# Patient Record
Sex: MALE | Race: WHITE | NOT HISPANIC OR LATINO | Employment: FULL TIME | URBAN - METROPOLITAN AREA
[De-identification: names, ages, dates, MRNs, and addresses within clinical notes are randomized per-mention and may not be internally consistent; named-entity substitution may affect disease eponyms.]

---

## 2019-01-15 ENCOUNTER — OFFICE VISIT (OUTPATIENT)
Dept: INTERNAL MEDICINE | Facility: CLINIC | Age: 34
End: 2019-01-15
Payer: COMMERCIAL

## 2019-01-15 VITALS
HEART RATE: 72 BPM | HEIGHT: 70 IN | SYSTOLIC BLOOD PRESSURE: 102 MMHG | BODY MASS INDEX: 25.68 KG/M2 | TEMPERATURE: 98.5 F | RESPIRATION RATE: 18 BRPM | OXYGEN SATURATION: 97 % | WEIGHT: 179.4 LBS | DIASTOLIC BLOOD PRESSURE: 68 MMHG

## 2019-01-15 DIAGNOSIS — E78.2 MIXED HYPERLIPIDEMIA: Primary | ICD-10-CM

## 2019-01-15 LAB
ALBUMIN SERPL-MCNC: 4.3 G/DL (ref 3.4–5)
ALP SERPL-CCNC: 59 U/L (ref 40–150)
ALT SERPL W P-5'-P-CCNC: 46 U/L (ref 0–70)
AST SERPL W P-5'-P-CCNC: 87 U/L (ref 0–45)
BILIRUB DIRECT SERPL-MCNC: 0.2 MG/DL (ref 0–0.2)
BILIRUB SERPL-MCNC: 0.8 MG/DL (ref 0.2–1.3)
CHOLEST SERPL-MCNC: 308 MG/DL
HDLC SERPL-MCNC: 48 MG/DL
LDLC SERPL CALC-MCNC: 242 MG/DL
NONHDLC SERPL-MCNC: 260 MG/DL
PROT SERPL-MCNC: 7.7 G/DL (ref 6.8–8.8)
TRIGL SERPL-MCNC: 90 MG/DL

## 2019-01-15 PROCEDURE — 80061 LIPID PANEL: CPT | Performed by: INTERNAL MEDICINE

## 2019-01-15 PROCEDURE — 99203 OFFICE O/P NEW LOW 30 MIN: CPT | Performed by: INTERNAL MEDICINE

## 2019-01-15 PROCEDURE — 80076 HEPATIC FUNCTION PANEL: CPT | Performed by: INTERNAL MEDICINE

## 2019-01-15 PROCEDURE — 36415 COLL VENOUS BLD VENIPUNCTURE: CPT | Performed by: INTERNAL MEDICINE

## 2019-01-15 RX ORDER — OMEGA-3/DHA/EPA/FISH OIL 60 MG-90MG
CAPSULE ORAL
COMMUNITY

## 2019-01-15 ASSESSMENT — ENCOUNTER SYMPTOMS
NAUSEA: 0
DYSURIA: 0
HEARTBURN: 0
FEVER: 0
ARTHRALGIAS: 0
WEAKNESS: 0
DIARRHEA: 0
PARESTHESIAS: 0
SORE THROAT: 0
HEADACHES: 0
MYALGIAS: 0
CONSTIPATION: 0
NERVOUS/ANXIOUS: 1
SHORTNESS OF BREATH: 1
JOINT SWELLING: 0
HEMATOCHEZIA: 0
FREQUENCY: 0
ABDOMINAL PAIN: 0
DIZZINESS: 0
HEMATURIA: 0
COUGH: 0
PALPITATIONS: 0
EYE PAIN: 0

## 2019-01-15 ASSESSMENT — MIFFLIN-ST. JEOR: SCORE: 1760

## 2019-01-15 NOTE — PROGRESS NOTES
"  SUBJECTIVE:   Melecio Platt is a 33 year old male who presents to clinic today for the following health issues:    Hyperlipidemia Follow-Up      Rate your low fat/cholesterol diet?: yes, had abnormal lipids last yr. Total cholesterol was 328 and  . On diet and exercise     Taking statin?  No    Other lipid medications/supplements?:  none      Patient Active Problem List   Diagnosis     Mixed hyperlipidemia     Past Surgical History:   Procedure Laterality Date     ORTHOPEDIC SURGERY  2006    Bilateral ankle       Social History     Tobacco Use     Smoking status: Never Smoker     Smokeless tobacco: Never Used   Substance Use Topics     Alcohol use: Yes     Comment: 1x/week     Family History   Problem Relation Age of Onset     Hyperlipidemia Mother      Hyperlipidemia Maternal Grandmother          Current Outpatient Medications   Medication Sig Dispense Refill     glucosamine-chondroitin 500-400 MG CAPS Take 1 capsule by mouth daily       Omega-3 Fatty Acids (FISH OIL) 500 MG CAPS        VITAMIN D, CHOLECALCIFEROL, PO Take by mouth daily         Reviewed and updated as needed this visit by clinical staff  Tobacco  Allergies  Meds  Med Hx  Surg Hx  Fam Hx  Soc Hx      Reviewed and updated as needed this visit by Provider         ROS:  CONSTITUTIONAL: NEGATIVE for fever, chills, change in weight  RESP: NEGATIVE for significant cough or SOB  CV: NEGATIVE for chest pain, palpitations or peripheral edema  MUSCULOSKELETAL: NEGATIVE for significant arthralgias or myalgia  NEURO: NEGATIVE for weakness, dizziness or paresthesias    OBJECTIVE:                                                    /68   Pulse 72   Temp 98.5  F (36.9  C) (Oral)   Resp 18   Ht 1.778 m (5' 10\")   Wt 81.4 kg (179 lb 6.4 oz)   SpO2 97%   BMI 25.74 kg/m    Body mass index is 25.74 kg/m .   GENERAL: healthy, alert, well nourished, well hydrated, no distress  EYES: Eyes grossly normal to inspection, extraocular movements " - intact, and PERRL  NECK: no tenderness, no adenopathy, no asymmetry, no masses, no stiffness;   RESP: lungs clear to auscultation - no rales, no rhonchi, no wheezes  CV: regular rates and rhythm,    MS: extremities-   no edema, no calf tenderness        ASSESSMENT/PLAN:                                                         (E78.2) Mixed hyperlipidemia  (primary encounter diagnosis)  Plan: Lipid panel reflex to direct LDL Fasting, Hepatic panel- total cholesterol 308 and  -- Very high cholesterol and LDL , pt was recommend to start  low dose cholesterol lowering med Lipitor 10 mg daily and repeat lipids in 3 months , if lipids still high then we can increase Lipitor to 20 mg Also, slightly high AST , advised to cut down any alcohol intake will repeat iver test in 3 months.    The benefits of lowering the cholesterol and improving the balance of lipids in view of the patient's age and risk status have been discussed, as well as verbal review of appropriate diet.  The need for lifelong general compliance in order to reduce risk is stressed.  A gradual exercise program is recommended to help maintain normal body weight and improve lipid balance.  Started on atorvastatin (LIPITOR) 10 MG tablet.explained clearly about the medication,insructions and side effects.            Abbi Lugo MD  [facility]

## 2019-01-15 NOTE — NURSING NOTE
"/68   Pulse 72   Temp 98.5  F (36.9  C) (Oral)   Resp 18   Ht 1.778 m (5' 10\")   Wt 81.4 kg (179 lb 6.4 oz)   SpO2 97%   BMI 25.74 kg/m    Patient in to discuss lipids.  Selin Mcmullen, TURNER    "

## 2019-01-18 PROBLEM — E78.2 MIXED HYPERLIPIDEMIA: Status: ACTIVE | Noted: 2019-01-18

## 2019-01-18 RX ORDER — ATORVASTATIN CALCIUM 10 MG/1
10 TABLET, FILM COATED ORAL DAILY
Qty: 90 TABLET | Refills: 3 | Status: SHIPPED | OUTPATIENT
Start: 2019-01-18 | End: 2021-02-19

## 2020-03-02 ENCOUNTER — HEALTH MAINTENANCE LETTER (OUTPATIENT)
Age: 35
End: 2020-03-02

## 2020-12-20 ENCOUNTER — HEALTH MAINTENANCE LETTER (OUTPATIENT)
Age: 35
End: 2020-12-20

## 2021-02-19 ENCOUNTER — OFFICE VISIT (OUTPATIENT)
Dept: INTERNAL MEDICINE | Facility: CLINIC | Age: 36
End: 2021-02-19
Payer: COMMERCIAL

## 2021-02-19 VITALS
RESPIRATION RATE: 17 BRPM | SYSTOLIC BLOOD PRESSURE: 109 MMHG | WEIGHT: 177.3 LBS | HEART RATE: 75 BPM | OXYGEN SATURATION: 97 % | DIASTOLIC BLOOD PRESSURE: 70 MMHG | TEMPERATURE: 98.9 F | HEIGHT: 70 IN | BODY MASS INDEX: 25.38 KG/M2

## 2021-02-19 DIAGNOSIS — M77.02 MEDIAL EPICONDYLITIS OF ELBOW, LEFT: ICD-10-CM

## 2021-02-19 DIAGNOSIS — M25.561 RIGHT KNEE PAIN, UNSPECIFIED CHRONICITY: ICD-10-CM

## 2021-02-19 DIAGNOSIS — Z00.00 ROUTINE HISTORY AND PHYSICAL EXAMINATION OF ADULT: Primary | ICD-10-CM

## 2021-02-19 DIAGNOSIS — E78.2 MIXED HYPERLIPIDEMIA: ICD-10-CM

## 2021-02-19 PROCEDURE — 99395 PREV VISIT EST AGE 18-39: CPT | Performed by: INTERNAL MEDICINE

## 2021-02-19 PROCEDURE — 99213 OFFICE O/P EST LOW 20 MIN: CPT | Mod: 25 | Performed by: INTERNAL MEDICINE

## 2021-02-19 RX ORDER — BETA-GLUCAN, (1-3) (1-4) 70 %
200 POWDER (GRAM) MISCELLANEOUS
COMMUNITY

## 2021-02-19 ASSESSMENT — MIFFLIN-ST. JEOR: SCORE: 1740.48

## 2021-02-19 ASSESSMENT — ENCOUNTER SYMPTOMS
FREQUENCY: 0
EYE PAIN: 0
HEMATOCHEZIA: 0
HEARTBURN: 0
FEVER: 0
CHILLS: 0
MYALGIAS: 1
ABDOMINAL PAIN: 0
PALPITATIONS: 0
PARESTHESIAS: 0
DYSURIA: 0
CONSTIPATION: 0
ARTHRALGIAS: 1
COUGH: 0
SHORTNESS OF BREATH: 0
WEAKNESS: 0
JOINT SWELLING: 1
NAUSEA: 0
HEADACHES: 0
HEMATURIA: 0
DIZZINESS: 0
DIARRHEA: 0
NERVOUS/ANXIOUS: 0
SORE THROAT: 0

## 2021-02-19 NOTE — NURSING NOTE
"/70   Pulse 75   Temp 98.9  F (37.2  C) (Oral)   Resp 17   Ht 1.778 m (5' 10\")   Wt 80.4 kg (177 lb 4.8 oz)   SpO2 97%   BMI 25.44 kg/m    Patient in for Male Px.  Selin Mcmullen CMA    "

## 2021-02-19 NOTE — PROGRESS NOTES
SUBJECTIVE:   CC: Melecio Platt is an 35 year old male who presents for preventative health visit.       Patient has been advised of split billing requirements and indicates understanding: Yes  Healthy Habits:     Getting at least 3 servings of Calcium per day:  Yes    Bi-annual eye exam:  Yes    Dental care twice a year:  Yes    Sleep apnea or symptoms of sleep apnea:  None    Diet:  Regular (no restrictions) and Gluten-free/reduced    Frequency of exercise:  2-3 days/week    Duration of exercise:  15-30 minutes    Taking medications regularly:  No    Barriers to taking medications:  Side effects    Medication side effects:  Not applicable    PHQ-2 Total Score: 0    Additional concerns today:  Yes       Patient complains of right knee pain since 4 to 5 days along with the swelling , patient states he runs every other day, 30-50 mins on a treadmill.  Patient states he was diagnosed with juvenile rheumatoid arthritis when he was a child.  Patient is active and would like to run regularly for exercise.  Has been taking over-the-counter ibuprofen without much symptom relief.    Patient also complains of Lt elbow and fore arm pain since 2 months, patient states he was carrying wood about 2 months ago and noticed the pain after that and he also plays a lot of guitar,  .      Hyperlipidemia Follow-Up      Are you regularly taking any medication or supplement to lower your cholesterol?   No    Are you having muscle aches or other side effects that you think could be caused by your cholesterol lowering medication?  NA      Today's PHQ-2 Score:   PHQ-2 ( 1999 [employer]) 2/19/2021   Q1: Little interest or pleasure in doing things 0   Q2: Feeling down, depressed or hopeless 0   PHQ-2 Score 0   Q1: Little interest or pleasure in doing things Not at all   Q2: Feeling down, depressed or hopeless Not at all   PHQ-2 Score 0       Abuse: Current or Past(Physical, Sexual or Emotional)- No  Do you feel safe in your environment?  Yes    Have you ever done Advance Care Planning? (For example, a Health Directive, POLST, or a discussion with a medical provider or your loved ones about your wishes): Yes, advance care planning is on file.      History reviewed. No pertinent past medical history.    Past Surgical History:   Procedure Laterality Date     ORTHOPEDIC SURGERY  2006    Bilateral ankle       Current Outpatient Medications   Medication Sig Dispense Refill     Beta Glucan POWD 200 mg 1 tablet/daily       glucosamine-chondroitin 500-400 MG CAPS Take 1 capsule by mouth daily       Misc Natural Products (JOINT HEALTH PO)        Omega-3 Fatty Acids (FISH OIL) 500 MG CAPS          Family History   Problem Relation Age of Onset     Hyperlipidemia Mother      Hyperlipidemia Maternal Grandmother        Social History     Tobacco Use     Smoking status: Never Smoker     Smokeless tobacco: Never Used   Substance Use Topics     Alcohol use: Yes     Comment: 1x/week     If you drink alcohol do you typically have >3 drinks per day or >7 drinks per week? No    Alcohol Use 2/19/2021   Prescreen: >3 drinks/day or >7 drinks/week? No   No flowsheet data found.    Last PSA: No results found for: PSA    Reviewed orders with patient. Reviewed health maintenance and updated orders accordingly - Yes      Reviewed and updated as needed this visit by clinical staff                 Reviewed and updated as needed this visit by Provider                    Review of Systems   Constitutional: Negative for chills and fever.   HENT: Negative for congestion, ear pain, hearing loss and sore throat.    Eyes: Negative for pain and visual disturbance.   Respiratory: Negative for cough and shortness of breath.    Cardiovascular: Negative for chest pain, palpitations and peripheral edema.   Gastrointestinal: Negative for abdominal pain, constipation, diarrhea, heartburn, hematochezia and nausea.   Genitourinary: Negative for discharge, dysuria, frequency, genital sores,  "hematuria, impotence and urgency.   Musculoskeletal: Positive for arthralgias, joint swelling and myalgias.   Skin: Negative for rash.   Neurological: Negative for dizziness, weakness, headaches and paresthesias.   Psychiatric/Behavioral: Negative for mood changes. The patient is not nervous/anxious.         OBJECTIVE:   /70   Pulse 75   Temp 98.9  F (37.2  C) (Oral)   Resp 17   Ht 1.778 m (5' 10\")   Wt 80.4 kg (177 lb 4.8 oz)   SpO2 97%   BMI 25.44 kg/m      Physical Exam  GENERAL: healthy, alert and no distress  EYES: Eyes grossly normal to inspection, PERRL and conjunctivae and sclerae normal  NECK: no adenopathy, no asymmetry, masses, or scars and thyroid normal to palpation  RESP: lungs clear to auscultation - no rales, rhonchi or wheezes  CV: regular rate and rhythm, normal S1 S2, no S3 or S4, no murmur, click or rub, no peripheral edema and peripheral pulses strong  ABDOMEN: soft, nontender, no hepatosplenomegaly, no masses and bowel sounds normal  MS: Rt knee- mild swelling noted and tenderness on jason lateral knee, lt elbow-tenderness on the medial epicondyles and medial forearm.  NEURO: Normal strength and tone, mentation intact and speech normal  PSYCH: mentation appears normal, affect normal/bright      ASSESSMENT/PLAN:       (Z00.00) Routine history and physical examination of adult  (primary encounter diagnosis)  Plan: Hemoglobin, Comprehensive metabolic panel,         Lipid panel reflex to direct LDL Fasting            (E78.2) Mixed hyperlipidemia  Plan: On diet and exercise .  Patient had very high cholesterol and LDL at last office visit and he has been following diet and exercise, check lipid panel reflex to direct LDL Fasting.pt was told I will contact after results and proceed accordingly.  Patient had a questions on statin and other cholesterol medications discussed in detail with the patient about statins there are side effects.     Elevated AST; 2-3 beers per week patient was " "advised to follow low-fat diet and will repeat liver enzymes             (M25.561) Right knee pain, unspecified chronicity  Plan: Orthopedic & Spine  Referral            (M77.02) Medial epicondylitis of elbow, left  Plan: Explained about the condition, APPLY ELBOW STRAP,ICE PRN,IBUPROFEN 400MG Q 8HRLY,LIMIT LIFTING,REPITITIOUS WRIST AND HAND WORK.Call or return to clinic prn if these symtoms worsen, fail to improve as anticipated, or if new symptoms develop.      Patient has been advised of split billing requirements and indicates understanding: Yes  COUNSELING:   Reviewed preventive health counseling, as reflected in patient instructions       Regular exercise       Healthy diet/nutrition    Estimated body mass index is 25.44 kg/m  as calculated from the following:    Height as of this encounter: 1.778 m (5' 10\").    Weight as of this encounter: 80.4 kg (177 lb 4.8 oz).     Weight management plan: Discussed healthy diet and exercise guidelines    He reports that he has never smoked. He has never used smokeless tobacco.         Counseling Resources:  ATP IV Guidelines  Pooled Cohorts Equation Calculator  FRAX Risk Assessment  ICSI Preventive Guidelines  Dietary Guidelines for Americans, 2010  USDA's MyPlate  ASA Prophylaxis  Lung CA Screening    Abbi Lugo MD  [facility]  "

## 2021-03-05 ENCOUNTER — OFFICE VISIT (OUTPATIENT)
Dept: ORTHOPEDICS | Facility: CLINIC | Age: 36
End: 2021-03-05
Attending: INTERNAL MEDICINE
Payer: COMMERCIAL

## 2021-03-05 ENCOUNTER — ANCILLARY PROCEDURE (OUTPATIENT)
Dept: GENERAL RADIOLOGY | Facility: CLINIC | Age: 36
End: 2021-03-05
Attending: FAMILY MEDICINE
Payer: COMMERCIAL

## 2021-03-05 VITALS
WEIGHT: 177 LBS | BODY MASS INDEX: 25.34 KG/M2 | DIASTOLIC BLOOD PRESSURE: 70 MMHG | HEIGHT: 70 IN | SYSTOLIC BLOOD PRESSURE: 116 MMHG

## 2021-03-05 DIAGNOSIS — M25.561 RIGHT KNEE PAIN, UNSPECIFIED CHRONICITY: ICD-10-CM

## 2021-03-05 DIAGNOSIS — M25.461 SWELLING OF JOINT OF RIGHT KNEE: Primary | ICD-10-CM

## 2021-03-05 PROCEDURE — 73562 X-RAY EXAM OF KNEE 3: CPT | Mod: RT | Performed by: FAMILY MEDICINE

## 2021-03-05 PROCEDURE — 99244 OFF/OP CNSLTJ NEW/EST MOD 40: CPT | Performed by: FAMILY MEDICINE

## 2021-03-05 RX ORDER — CELECOXIB 200 MG/1
200 CAPSULE ORAL DAILY PRN
Qty: 30 CAPSULE | Refills: 2 | Status: SHIPPED | OUTPATIENT
Start: 2021-03-05 | End: 2022-05-27

## 2021-03-05 ASSESSMENT — MIFFLIN-ST. JEOR: SCORE: 1739.12

## 2021-03-05 NOTE — PROGRESS NOTES
ASSESSMENT & PLAN  Patient Instructions     1. Swelling of joint of right knee    2. Right knee pain, unspecified chronicity      -Patient has right knee pain and swelling likely due to an inflammatory condition  -Patient has history of possible juvenile rheumatoid arthritis that required anti-inflammatory usage for over a year  -Patient will obtain inflammatory blood work at his primary care physician's office.  He may call to schedule a nursing visit to have labs drawn.  -Patient will start Celebrex 200 mg daily for the next 2 to 3 weeks and then on an as-needed basis as pain improves  -Patient will start formal physical therapy and home exercise program  -Patient will follow up in 3 to 4 weeks for reevaluation and progression of activity.  If swelling worsens, to consider drainage and sending off for synovial fluid analysis  -Call direct clinic number [[phone]] at any time with questions or concerns.    Albert Yeo MD [facility]  [facility]          -----    SUBJECTIVE  Melecio Platt is a/an 35 year old male who is seen in consultation at the request of  Abbi Lugo M.D. for evaluation of right knee pain. The patient is seen by themselves.    Onset: 3 week(s) ago. Reports insidious onset without acute precipitating event. First noticed when the weather was really cold. Does have a history of knee pain and states was diagnosed with RA at the age of 14; but pain went away.   Location of Pain: right joint line and medial aspect of the knee, with some occasional posterior pain as well.   Rating of Pain at worst: 2/10  Rating of Pain Currently: 1/10  Worsened by: pressure on the knees, kneeling, treadmill on the an incline. 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     No past medical history on file.  Social History     Socioeconomic History     Marital status:      Spouse name: Not on file     Number of children: Not on file     Years of education: Not on file     Highest education level: Not on file   Occupational History     Not on file   Social Needs     Financial resource strain: Not on file     Food insecurity     Worry: Not on file     Inability: Not on file     Transportation needs     Medical: Not on file     Non-medical: Not on file   Tobacco Use     Smoking status: Never Smoker     Smokeless tobacco: Never Used   Substance and Sexual Activity     Alcohol use: Yes     Comment: 1x/week     Drug use: No     Sexual activity: Yes     Partners: Female   Lifestyle     Physical activity     Days per week: Not on file     Minutes per session: Not on file     Stress: Not on file   Relationships     Social connections     Talks on phone: Not on file     Gets together: Not on file     Attends Rastafarian service: Not on file     Active member of club or organization: Not on file     Attends meetings of clubs or organizations: Not on file     Relationship status: Not on file     Intimate partner violence     Fear of current or ex partner: Not on file     Emotionally abused: Not on file     Physically abused: Not on file     Forced sexual activity: Not on file   Other Topics Concern     Not on file   Social History Narrative     Not on file         Patient's past medical, surgical, social, and family histories were reviewed today and no changes are noted.    REVIEW OF SYSTEMS:  10 point ROS is negative other than symptoms noted above in HPI, Past Medical History or as stated below  Constitutional: NEGATIVE for fever, chills, change in weight  Skin: NEGATIVE for  "worrisome rashes, moles or lesions  GI/: NEGATIVE for bowel or bladder changes  Neuro: NEGATIVE for weakness, dizziness or paresthesias    OBJECTIVE:  /70   Ht 1.778 m (5' 10\")   Wt 80.3 kg (177 lb)   BMI 25.40 kg/m     General: healthy, alert and in no distress  HEENT: no scleral icterus or conjunctival erythema  Skin: no suspicious lesions or rash. No jaundice.  CV: no pedal edema  Resp: normal respiratory effort without conversational dyspnea   Psych: normal mood and affect  Gait: normal steady gait with appropriate coordination and balance  Neuro: Normal light sensory exam of lower extremity  MSK:  LEFT KNEE  Inspection:    normal alignment  Palpation:    Tender about the lateral patellar facet, medial patellar facet, lateral joint line and medial joint line. Remainder of bony and ligamentous landmarks are nontender.    Mild effusion is present    Patellofemoral crepitus is Absent  Range of Motion:     00 extension to 1250 flexion  Strength:    Quadriceps grossly intact    Extensor mechanism intact  Special Tests:    Positive: Gerry's    Negative: MCL/valgus stress (0 & 30 deg), LCL/varus stress (0 & 30 deg), Lachman's, anterior drawer, posterior drawer    Independent visualization of the below image:  Recent Results (from the past 24 hour(s))   XR Knee Standing AP Bilat [place] Bilat Lat Right    Narrative    No acute fracture, dislocation or osseous abnormalities.  Lateral tilting   of patella.             Albert Yeo MD [facility]    "

## 2021-03-05 NOTE — PATIENT INSTRUCTIONS
1. Swelling of joint of right knee    2. Right knee pain, unspecified chronicity      -Patient has right knee pain and swelling likely due to an inflammatory condition  -Patient has history of possible juvenile rheumatoid arthritis that required anti-inflammatory usage for over a year  -Patient will obtain inflammatory blood work at his primary care physician's office.  He may call to schedule a nursing visit to have labs drawn.  -Patient will start Celebrex 200 mg daily for the next 2 to 3 weeks and then on an as-needed basis as pain improves  -Patient will start formal physical therapy and home exercise program  -Patient will follow up in 3 to 4 weeks for reevaluation and progression of activity.  If swelling worsens, to consider drainage and sending off for synovial fluid analysis  -Call direct clinic number [[phone]] at any time with questions or concerns.    Albert Yeo MD [facility]  [facility] [place]

## 2021-03-05 NOTE — LETTER
3/5/2021         RE: Melecio Platt  [address] [place] [facility] [address]        Dear Colleague,    Thank you for referring your patient, Melecio Platt, to the [facility] [place]. Please see a copy of my visit note below.    ASSESSMENT & PLAN  Patient Instructions     1. Swelling of joint of right knee    2. Right knee pain, unspecified chronicity      -Patient has right knee pain and swelling likely due to an inflammatory condition  -Patient has history of possible juvenile rheumatoid arthritis that required anti-inflammatory usage for over a year  -Patient will obtain inflammatory blood work at his primary care physician's office.  He may call to schedule a nursing visit to have labs drawn.  -Patient will start Celebrex 200 mg daily for the next 2 to 3 weeks and then on an as-needed basis as pain improves  -Patient will start formal physical therapy and home exercise program  -Patient will follow up in 3 to 4 weeks for reevaluation and progression of activity.  If swelling worsens, to consider drainage and sending off for synovial fluid analysis  -Call direct clinic number [[phone]] at any time with questions or concerns.    Albert Yeo MD [facility]  [facility] [place]          -----    SUBJECTIVE  Melecio Platt is a/an 35 year old male who is seen in consultation at the request of  Abbi Lugo M.D. for evaluation of right knee pain. The patient is seen by themselves.    Onset: 3 week(s) ago. Reports insidious onset without acute precipitating event. First noticed when the weather was really cold. Does have a history of knee pain and states was diagnosed with RA at the age of 14; but pain went away.   Location of Pain: right joint line and medial aspect of the knee, with some occasional posterior pain as well.   Rating of Pain at worst: 2/10  Rating of Pain Currently: 1/10  Worsened by: pressure  on the knees, kneeling, treadmill on the an incline. 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     No past medical history on file.  Social History     Socioeconomic History     Marital status:      Spouse name: Not on file     Number of children: Not on file     Years of education: Not on file     Highest education level: Not on file   Occupational History     Not on file   Social Needs     Financial resource strain: Not on file     Food insecurity     Worry: Not on file     Inability: Not on file     Transportation needs     Medical: Not on file     Non-medical: Not on file   Tobacco Use     Smoking status: Never Smoker     Smokeless tobacco: Never Used   Substance and Sexual Activity     Alcohol use: Yes     Comment: 1x/week     Drug use: No     Sexual activity: Yes     Partners: Female   Lifestyle     Physical activity     Days per week: Not on file     Minutes per session: Not on file     Stress: Not on file   Relationships     Social connections     Talks on phone: Not on file     Gets together: Not on file     Attends Lutheran service: Not on file     Active member of club or organization: Not on file     Attends meetings of clubs or organizations: Not on file     Relationship status: Not on file     Intimate partner violence     Fear of current or ex partner: Not on file     Emotionally abused: Not on file     Physically abused: Not on file     Forced sexual activity: Not on file   Other Topics Concern     Not on file   Social History Narrative     Not on file         Patient's past medical, surgical, social, and family histories  "were reviewed today and no changes are noted.    REVIEW OF SYSTEMS:  10 point ROS is negative other than symptoms noted above in HPI, Past Medical History or as stated below  Constitutional: NEGATIVE for fever, chills, change in weight  Skin: NEGATIVE for worrisome rashes, moles or lesions  GI/: NEGATIVE for bowel or bladder changes  Neuro: NEGATIVE for weakness, dizziness or paresthesias    OBJECTIVE:  /70   Ht 1.778 m (5' 10\")   Wt 80.3 kg (177 lb)   BMI 25.40 kg/m     General: healthy, alert and in no distress  HEENT: no scleral icterus or conjunctival erythema  Skin: no suspicious lesions or rash. No jaundice.  CV: no pedal edema  Resp: normal respiratory effort without conversational dyspnea   Psych: normal mood and affect  Gait: normal steady gait with appropriate coordination and balance  Neuro: Normal light sensory exam of lower extremity  MSK:  LEFT KNEE  Inspection:    normal alignment  Palpation:    Tender about the lateral patellar facet, medial patellar facet, lateral joint line and medial joint line. Remainder of bony and ligamentous landmarks are nontender.    Mild effusion is present    Patellofemoral crepitus is Absent  Range of Motion:     00 extension to 1250 flexion  Strength:    Quadriceps grossly intact    Extensor mechanism intact  Special Tests:    Positive: Gerry's    Negative: MCL/valgus stress (0 & 30 deg), LCL/varus stress (0 & 30 deg), Lachman's, anterior drawer, posterior drawer    Independent visualization of the below image:  Recent Results (from the past 24 hour(s))   XR Knee Standing AP Bilat [place] Bilat Lat Right    Narrative    No acute fracture, dislocation or osseous abnormalities.  Lateral tilting   of patella.             Albert Yeo MD [facility]        Again, thank you for allowing me to participate in the care of your patient.        Sincerely,        Albert Yeo, MD    "

## 2021-03-08 ENCOUNTER — TELEPHONE (OUTPATIENT)
Dept: ORTHOPEDICS | Facility: CLINIC | Age: 36
End: 2021-03-08

## 2021-03-08 NOTE — TELEPHONE ENCOUNTER
Prior Authorization Specialty Medication Request    Medication/Dose: Celecoxib 200mg  ICD code (if different than what is on RX):   Previously Tried and Failed:  Rest, ibuprofen      Insurance Name: [id number] [facility]   Insurance ID: [id number]   Insurance Phone Number: [phone]     Pharmacy Information (if different than what is on RX)  [facility]  [facility]  Phone:  [phone]

## 2021-03-09 NOTE — TELEPHONE ENCOUNTER
Central Prior Authorization Team   Phone: [phone]      PA Initiation    Medication: Celecoxib 200mg  Insurance Company: [employer] - Phone [phone] Fax [phone]  Pharmacy Filling the Rx: [facility] #[id number] [place], [place] - [address] [place] [address] [address] AT [facility] & [facility]  Filling Pharmacy Phone: [phone]  Filling Pharmacy Fax:    Start Date: 3/9/2021

## 2021-03-09 NOTE — TELEPHONE ENCOUNTER
Prior Authorization Approval    Authorization Effective Date: 2/7/2021  Authorization Expiration Date: 3/9/2022  Medication: Celecoxib 200mg  Approved Dose/Quantity:    Reference #:     Insurance Company: [employer] - Phone [phone] Fax [phone]  Expected CoPay:       CoPay Card Available:      Foundation Assistance Needed:    Which Pharmacy is filling the prescription (Not needed for infusion/clinic administered): [facility] #[id number] - [address] [place] [address]  Pharmacy Notified: Yes  Patient Notified: Yes  **Instructed pharmacy to notify patient when script is ready to /ship.**

## 2021-03-15 DIAGNOSIS — M25.461 SWELLING OF JOINT OF RIGHT KNEE: ICD-10-CM

## 2021-03-15 DIAGNOSIS — Z00.00 ROUTINE HISTORY AND PHYSICAL EXAMINATION OF ADULT: ICD-10-CM

## 2021-03-15 DIAGNOSIS — M25.561 RIGHT KNEE PAIN, UNSPECIFIED CHRONICITY: ICD-10-CM

## 2021-03-15 DIAGNOSIS — E78.2 MIXED HYPERLIPIDEMIA: ICD-10-CM

## 2021-03-15 LAB
CRP SERPL-MCNC: <2.9 MG/L (ref 0–8)
ERYTHROCYTE [SEDIMENTATION RATE] IN BLOOD BY WESTERGREN METHOD: 5 MM/H (ref 0–15)
HGB BLD-MCNC: 14.6 G/DL (ref 13.3–17.7)

## 2021-03-15 PROCEDURE — 86140 C-REACTIVE PROTEIN: CPT | Performed by: FAMILY MEDICINE

## 2021-03-15 PROCEDURE — 86200 CCP ANTIBODY: CPT | Performed by: FAMILY MEDICINE

## 2021-03-15 PROCEDURE — 80053 COMPREHEN METABOLIC PANEL: CPT | Performed by: FAMILY MEDICINE

## 2021-03-15 PROCEDURE — 36415 COLL VENOUS BLD VENIPUNCTURE: CPT | Performed by: FAMILY MEDICINE

## 2021-03-15 PROCEDURE — 85652 RBC SED RATE AUTOMATED: CPT | Performed by: FAMILY MEDICINE

## 2021-03-15 PROCEDURE — 80061 LIPID PANEL: CPT | Performed by: FAMILY MEDICINE

## 2021-03-15 PROCEDURE — 85018 HEMOGLOBIN: CPT | Performed by: FAMILY MEDICINE

## 2021-03-15 PROCEDURE — 86431 RHEUMATOID FACTOR QUANT: CPT | Performed by: FAMILY MEDICINE

## 2021-03-16 LAB
ALBUMIN SERPL-MCNC: 4.3 G/DL (ref 3.4–5)
ALP SERPL-CCNC: 62 U/L (ref 40–150)
ALT SERPL W P-5'-P-CCNC: 23 U/L (ref 0–70)
ANION GAP SERPL CALCULATED.3IONS-SCNC: 4 MMOL/L (ref 3–14)
AST SERPL W P-5'-P-CCNC: 16 U/L (ref 0–45)
BILIRUB SERPL-MCNC: 0.7 MG/DL (ref 0.2–1.3)
BUN SERPL-MCNC: 15 MG/DL (ref 7–30)
CALCIUM SERPL-MCNC: 9.1 MG/DL (ref 8.5–10.1)
CCP AB SER IA-ACNC: 1 U/ML
CHLORIDE SERPL-SCNC: 106 MMOL/L (ref 94–109)
CHOLEST SERPL-MCNC: 327 MG/DL
CO2 SERPL-SCNC: 29 MMOL/L (ref 20–32)
CREAT SERPL-MCNC: 1.02 MG/DL (ref 0.66–1.25)
GFR SERPL CREATININE-BSD FRML MDRD: >90 ML/MIN/{1.73_M2}
GLUCOSE SERPL-MCNC: 90 MG/DL (ref 70–99)
HDLC SERPL-MCNC: 56 MG/DL
LDLC SERPL CALC-MCNC: 255 MG/DL
NONHDLC SERPL-MCNC: 271 MG/DL
POTASSIUM SERPL-SCNC: 4.1 MMOL/L (ref 3.4–5.3)
PROT SERPL-MCNC: 7.9 G/DL (ref 6.8–8.8)
RHEUMATOID FACT SER NEPH-ACNC: <7 IU/ML (ref 0–20)
SODIUM SERPL-SCNC: 139 MMOL/L (ref 133–144)
TRIGL SERPL-MCNC: 81 MG/DL

## 2021-03-23 ENCOUNTER — THERAPY VISIT (OUTPATIENT)
Dept: PHYSICAL THERAPY | Facility: CLINIC | Age: 36
End: 2021-03-23
Attending: FAMILY MEDICINE
Payer: COMMERCIAL

## 2021-03-23 DIAGNOSIS — M25.561 RIGHT KNEE PAIN, UNSPECIFIED CHRONICITY: ICD-10-CM

## 2021-03-23 PROCEDURE — 97161 PT EVAL LOW COMPLEX 20 MIN: CPT | Mod: GP | Performed by: PHYSICAL THERAPIST

## 2021-03-23 PROCEDURE — 97110 THERAPEUTIC EXERCISES: CPT | Mod: GP | Performed by: PHYSICAL THERAPIST

## 2021-03-23 ASSESSMENT — ACTIVITIES OF DAILY LIVING (ADL)
KNEEL ON THE FRONT OF YOUR KNEE: ACTIVITY IS MINIMALLY DIFFICULT
SIT WITH YOUR KNEE BENT: ACTIVITY IS MINIMALLY DIFFICULT
GIVING WAY, BUCKLING OR SHIFTING OF KNEE: I HAVE THE SYMPTOM BUT IT DOES NOT AFFECT MY ACTIVITY
HOW_WOULD_YOU_RATE_THE_CURRENT_FUNCTION_OF_YOUR_KNEE_DURING_YOUR_USUAL_DAILY_ACTIVITIES_ON_A_SCALE_FROM_0_TO_100_WITH_100_BEING_YOUR_LEVEL_OF_KNEE_FUNCTION_PRIOR_TO_YOUR_INJURY_AND_0_BEING_THE_INABILITY_TO_PERFORM_ANY_OF_YOUR_USUAL_DAILY_ACTIVITIES?: 93
RISE FROM A CHAIR: ACTIVITY IS NOT DIFFICULT
LIMPING: I DO NOT HAVE THE SYMPTOM
AS_A_RESULT_OF_YOUR_KNEE_INJURY,_HOW_WOULD_YOU_RATE_YOUR_CURRENT_LEVEL_OF_DAILY_ACTIVITY?: NEARLY NORMAL
GO UP STAIRS: ACTIVITY IS NOT DIFFICULT
SQUAT: ACTIVITY IS MINIMALLY DIFFICULT
HOW_WOULD_YOU_RATE_THE_OVERALL_FUNCTION_OF_YOUR_KNEE_DURING_YOUR_USUAL_DAILY_ACTIVITIES?: NORMAL
PAIN: I HAVE THE SYMPTOM BUT IT DOES NOT AFFECT MY ACTIVITY
WALK: ACTIVITY IS NOT DIFFICULT
WEAKNESS: THE SYMPTOM AFFECTS MY ACTIVITY SLIGHTLY
KNEE_ACTIVITY_OF_DAILY_LIVING_SCORE: 87.14
RAW_SCORE: 61
SWELLING: THE SYMPTOM AFFECTS MY ACTIVITY SLIGHTLY
KNEE_ACTIVITY_OF_DAILY_LIVING_SUM: 61
STAND: ACTIVITY IS NOT DIFFICULT
GO DOWN STAIRS: ACTIVITY IS NOT DIFFICULT
STIFFNESS: I DO NOT HAVE THE SYMPTOM

## 2021-03-23 NOTE — PROGRESS NOTES
Physical Therapy Initial Evaluation  Subjective:    Patient Health History  Melecio Platt being seen for Water on the right knee..     Problem began: 3/22/2021.   Problem occurred: Unknown no injury just popped up   Pain is reported as 1/10 on pain scale.  General health as reported by patient is good.  Pertinent medical history includes: none.     Medical allergies: none.   Surgeries include:  Other. Other surgery history details: Both ankles have needed operations at differnt dates in the past.    Current medications:  Anti-inflammatory and other. Other medications details: Statan for high cholesterol.    Current occupation is .   Primary job tasks include:  Computer work.                Physical Therapy Initial Evaluation   3/23/2021   Precautions/Restrictions/MD instructions: PT eval and treat   Therapist Impression:   Pt is a 34 y/o male, with 1 month history of right knee pain and swelling. Pt presents with pain, swelling, decreased ROM, poor balance and decreased strength consistent with quad weakness. These impairments limit their ability to kneel (to play with child); squat; bend knee fully. Skilled PT services necessary in order to reduce impairments and improve independent function.    Subjective:   Chief Complaint: R knee pain and swelling. 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   DOI/onset: 2/23/2021  DOS: none   Location: R knee  Quality: dull/ache  Frequency: intermittent  Radiates: none  Pain scale: Rest 0/10 Activity 3/10    Sleeping: not affected    Exacerbated by: kneel, squat, bend knee fully  Relieved by: celebrex  Progression: getting better   Previous Treatment: none Effect of prior treatment: na   PMH and/or surgical history: ankles (bilaterally)   Imaging: Xray     Occupation: Desk work  Job duties: computer   Current HEP/exercise regimen: treadmill, jogging outdoors, strengthening program (light dumbells, and body weight)  Patient's goals: painfree right  knee  Medications: celebrex   General health as reported by patient: good   Return to MD: see Dr. Yeo Friday  Red Flags: Juvenile RA                        Objective:  KNEE:    Standing Posture: WFL     SL Balance:   L: 10 sec (R hip drop)  R: 10 sec (L hip drop)    Gait: right side - slight lack of knee extension    Functional:   - Squat: narrow stance and knees over toes  - SL Squat= bilaterally dynamic valgus and knees over toes      Swelling:    L R   Joint line 35cm 36cm           AROM: (* indicates patient's pain)   PROM:(over pressure)   L  R L R   Hyperextension   0   0 3 2   Extension   0 0 0 0   Flexion   140 135 140 135     Strength:   L R   HIP     Flex 5/5 4/5   Ext 4-/5 4-/5   ABd 3+/5 3+/5   KNEE       QSs: fair on right      Special tests:   L R   Sweep Test - ++       Palpation: no pain to medial and lateral joint line      System    Physical Exam    General     ROS    Assessment/Plan:    Patient is a 35 year old male with right side knee complaints.    Patient has the following significant findings with corresponding treatment plan.                Diagnosis 1:  R knee pain; quad weakness   Pain -  hot/cold therapy, self management, education and home program  Decreased ROM/flexibility - manual therapy and therapeutic exercise  Decreased strength - therapeutic exercise and therapeutic activities  Impaired balance - neuro re-education and therapeutic activities  Decreased proprioception - neuro re-education and therapeutic activities  Inflammation - cold therapy and self management/home program  Edema - vasopneumatics and self management/home program  Impaired gait - gait training  Impaired muscle performance - neuro re-education  Decreased function - therapeutic activities  Instability -  Therapeutic Activity  Therapeutic Exercise    Therapy Evaluation Codes:     Cumulative Therapy Evaluation is: Low complexity.    Previous and current functional limitations:  (See Goal Flow Sheet for this  information)    Short term and Long term goals: (See Goal Flow Sheet for this information)     Communication ability:  Patient appears to be able to clearly communicate and understand verbal and written communication and follow directions correctly.  Treatment Explanation - The following has been discussed with the patient:   RX ordered/plan of care  Anticipated outcomes  Possible risks and side effects  This patient would benefit from PT intervention to resume normal activities.   Rehab potential is good.    Frequency:  1 X week, once daily  Duration:  for 6 weeks  Discharge Plan:  Achieve all LTG.  Independent in home treatment program.  Reach maximal therapeutic benefit.    Please refer to the daily flowsheet for treatment today, total treatment time and time spent performing 1:1 timed codes.

## 2021-03-26 ENCOUNTER — OFFICE VISIT (OUTPATIENT)
Dept: ORTHOPEDICS | Facility: CLINIC | Age: 36
End: 2021-03-26
Payer: COMMERCIAL

## 2021-03-26 VITALS
SYSTOLIC BLOOD PRESSURE: 118 MMHG | BODY MASS INDEX: 25.34 KG/M2 | DIASTOLIC BLOOD PRESSURE: 68 MMHG | WEIGHT: 177 LBS | HEIGHT: 70 IN

## 2021-03-26 DIAGNOSIS — M25.561 RIGHT KNEE PAIN, UNSPECIFIED CHRONICITY: Primary | ICD-10-CM

## 2021-03-26 PROCEDURE — 99214 OFFICE O/P EST MOD 30 MIN: CPT | Performed by: FAMILY MEDICINE

## 2021-03-26 ASSESSMENT — MIFFLIN-ST. JEOR: SCORE: 1739.12

## 2021-03-26 NOTE — PROGRESS NOTES
ASSESSMENT & PLAN  Patient Instructions     1. Right knee pain, unspecified chronicity      -Patient is following up for right knee pain and swelling likely due to cartilage and/or meniscus injuries  -Patient may continue with Celebrex as needed just over the next month  -Patient will try Voltaren gel as needed  -Patient will continue with therapy and home exercise program  -Patient has decreased swelling in his knee today and so deferred aspiration and synovial fluid analysis  -Patient was evaluated in office today with an ultrasound for posterior knee pain and swelling.  No popliteal cyst was seen.  His posterior knee pain is likely due to injury of the posterior horn of the medial meniscus.  -Patient should focus on biking and elliptical for regular cardiovascular activity.  Patient may continue strength training as tolerated.  We discussed modifications of his workouts and improvements in technique of the lower body exercises.  -Patient will follow up if symptoms do not continue to improve or if significant swelling returns  -Call direct clinic number [[phone]] at any time with questions or concerns.    Albert Yeo MD [facility] Orthopedics and Sports Medicine  [facility]          -----    SUBJECTIVE:  Melecio Platt is a 35 year old male who is seen in follow-up for right knee pain and swelling.They were last seen 3/5/2021 he has been taking the Celebrex and started PT.     Since their last visit reports 20% improvement. They indicate that their current pain level is 0/10. They have tried rest/activity avoidance, ice, ibuprofen, other medications: Celebrex and physical therapy (1 visits).      The patient is seen by themselves.    Patient's past medical, surgical, social, and family histories were reviewed today and no changes are noted.    REVIEW OF SYSTEMS:  Constitutional: NEGATIVE for fever, chills, change in weight  Skin: NEGATIVE for worrisome rashes, moles or  "lesions  GI/: NEGATIVE for bowel or bladder changes  Neuro: NEGATIVE for weakness, dizziness or paresthesias    OBJECTIVE:  /68   Ht 1.778 m (5' 10\")   Wt 80.3 kg (177 lb)   BMI 25.40 kg/m     General: healthy, alert and in no distress  HEENT: no scleral icterus or conjunctival erythema  Skin: no suspicious lesions or rash. No jaundice.  CV: regular rhythm by palpation, no pedal edema  Resp: normal respiratory effort without conversational dyspnea   Psych: normal mood and affect  Gait: normal steady gait with appropriate coordination and balance  Neuro: normal light touch sensory exam of the extremities.    MSK:  LEFT KNEE  Inspection:    normal alignment  Palpation:    Tender about the lateral patellar facet, medial patellar facet, lateral joint line and medial joint line. Remainder of bony and ligamentous landmarks are nontender.    Mild effusion is present    Patellofemoral crepitus is Absent  Range of Motion:     00 extension to 1250 flexion  Strength:    Quadriceps grossly intact    Extensor mechanism intact  Special Tests:    Positive: Gerry's    Negative: MCL/valgus stress (0 & 30 deg), LCL/varus stress (0 & 30 deg), Lachman's, anterior drawer, posterior drawer    Independent visualization of the below image:    Patient's conditions were thoroughly discussed during today's visit with greater than 50% of the visit spent counseling the patient with total time spent face-to-face with the patient being 33 minutes.    Albert Yeo MD, [facility]        "

## 2021-03-26 NOTE — LETTER
3/26/2021         RE: Melecio Platt  [address] [facility] [address]        Dear Colleague,    Thank you for referring your patient, Melecio Platt, to the [facility] [place]. Please see a copy of my visit note below.    ASSESSMENT & PLAN  Patient Instructions     1. Right knee pain, unspecified chronicity      -Patient is following up for right knee pain and swelling likely due to cartilage and/or meniscus injuries  -Patient may continue with Celebrex as needed just over the next month  -Patient will try Voltaren gel as needed  -Patient will continue with therapy and home exercise program  -Patient has decreased swelling in his knee today and so deferred aspiration and synovial fluid analysis  -Patient was evaluated in office today with an ultrasound for posterior knee pain and swelling.  No popliteal cyst was seen.  His posterior knee pain is likely due to injury of the posterior horn of the medial meniscus.  -Patient should focus on biking and elliptical for regular cardiovascular activity.  Patient may continue strength training as tolerated.  We discussed modifications of his workouts and improvements in technique of the lower body exercises.  -Patient will follow up if symptoms do not continue to improve or if significant swelling returns  -Call direct clinic number [[phone]] at any time with questions or concerns.    Albert Yeo MD [facility] Orthopedics and Sports Medicine  [facility]          -----    SUBJECTIVE:  Melecio Platt is a 35 year old male who is seen in follow-up for right knee pain and swelling.They were last seen 3/5/2021 he has been taking the Celebrex and started PT.     Since their last visit reports 20% improvement. They indicate that their current pain level is 0/10. They have tried rest/activity avoidance, ice, ibuprofen, other medications: Celebrex and physical therapy (1 visits).      The patient is  "seen by themselves.    Patient's past medical, surgical, social, and family histories were reviewed today and no changes are noted.    REVIEW OF SYSTEMS:  Constitutional: NEGATIVE for fever, chills, change in weight  Skin: NEGATIVE for worrisome rashes, moles or lesions  GI/: NEGATIVE for bowel or bladder changes  Neuro: NEGATIVE for weakness, dizziness or paresthesias    OBJECTIVE:  /68   Ht 1.778 m (5' 10\")   Wt 80.3 kg (177 lb)   BMI 25.40 kg/m     General: healthy, alert and in no distress  HEENT: no scleral icterus or conjunctival erythema  Skin: no suspicious lesions or rash. No jaundice.  CV: regular rhythm by palpation, no pedal edema  Resp: normal respiratory effort without conversational dyspnea   Psych: normal mood and affect  Gait: normal steady gait with appropriate coordination and balance  Neuro: normal light touch sensory exam of the extremities.    MSK:  LEFT KNEE  Inspection:    normal alignment  Palpation:    Tender about the lateral patellar facet, medial patellar facet, lateral joint line and medial joint line. Remainder of bony and ligamentous landmarks are nontender.    Mild effusion is present    Patellofemoral crepitus is Absent  Range of Motion:     00 extension to 1250 flexion  Strength:    Quadriceps grossly intact    Extensor mechanism intact  Special Tests:    Positive: Gerry's    Negative: MCL/valgus stress (0 & 30 deg), LCL/varus stress (0 & 30 deg), Lachman's, anterior drawer, posterior drawer    Independent visualization of the below image:    Patient's conditions were thoroughly discussed during today's visit with greater than 50% of the visit spent counseling the patient with total time spent face-to-face with the patient being 33 minutes.    Albert Yeo MD, [facility]            Again, thank you for allowing me to participate in the care of your patient.        Sincerely,        Albert Yeo, MD    "

## 2021-03-31 ENCOUNTER — TELEPHONE (OUTPATIENT)
Dept: ORTHOPEDICS | Facility: CLINIC | Age: 36
End: 2021-03-31

## 2021-03-31 NOTE — TELEPHONE ENCOUNTER
Central Prior Authorization Team   Phone: [phone]      PA Initiation    Medication: Diclofenac Sodium Gel  Insurance Company: [employer] - Phone [phone] Fax [phone]  Pharmacy Filling the Rx: [facility] #[id number] [place], [place] - [address] [place] [address] [address] AT [facility]  Filling Pharmacy Phone: [phone]  Filling Pharmacy Fax:    Start Date: 3/31/2021

## 2021-03-31 NOTE — TELEPHONE ENCOUNTER
Prior Authorization Approval    Authorization Effective Date: 3/1/2021  Authorization Expiration Date: 3/31/2022  Medication: Diclofenac Sodium Gel  Approved Dose/Quantity:    Reference #:     Insurance Company: [employer] - Phone [phone] Fax [phone]  Expected CoPay:       CoPay Card Available:      Foundation Assistance Needed:    Which Pharmacy is filling the prescription (Not needed for infusion/clinic administered): [facility] #[id number] - [address] [place] [address]  Pharmacy Notified: Yes  Patient Notified: Yes  **Instructed pharmacy to notify patient when script is ready to /ship.**

## 2021-03-31 NOTE — TELEPHONE ENCOUNTER
Prior Authorization Retail Medication Request    Medication/Dose: Diclofenac Sodium Gel  ICD code (if different than what is on RX):    Previously Tried and Failed:  rest/activity avoidance, ice, ibuprofen, other medications: Celebrex and physical therapy (1 visits).    Rationale:      Insurance Name:  [employer] [place]   Insurance ID:  [id number]      Pharmacy Information (if different than what is on RX)  Name:  [facility]    Phone:

## 2021-04-09 ENCOUNTER — THERAPY VISIT (OUTPATIENT)
Dept: PHYSICAL THERAPY | Facility: CLINIC | Age: 36
End: 2021-04-09
Payer: COMMERCIAL

## 2021-04-09 DIAGNOSIS — M25.561 RIGHT KNEE PAIN, UNSPECIFIED CHRONICITY: Primary | ICD-10-CM

## 2021-04-09 PROCEDURE — 97112 NEUROMUSCULAR REEDUCATION: CPT | Mod: GP | Performed by: PHYSICAL THERAPIST

## 2021-04-09 PROCEDURE — 97530 THERAPEUTIC ACTIVITIES: CPT | Mod: GP | Performed by: PHYSICAL THERAPIST

## 2021-04-09 PROCEDURE — 97110 THERAPEUTIC EXERCISES: CPT | Mod: GP | Performed by: PHYSICAL THERAPIST

## 2021-04-20 ENCOUNTER — THERAPY VISIT (OUTPATIENT)
Dept: PHYSICAL THERAPY | Facility: CLINIC | Age: 36
End: 2021-04-20
Payer: COMMERCIAL

## 2021-04-20 DIAGNOSIS — M25.561 RIGHT KNEE PAIN, UNSPECIFIED CHRONICITY: Primary | ICD-10-CM

## 2021-04-20 PROCEDURE — 97110 THERAPEUTIC EXERCISES: CPT | Mod: GP | Performed by: PHYSICAL THERAPIST

## 2021-04-20 PROCEDURE — 97112 NEUROMUSCULAR REEDUCATION: CPT | Mod: GP | Performed by: PHYSICAL THERAPIST

## 2021-04-28 ENCOUNTER — OFFICE VISIT (OUTPATIENT)
Dept: ORTHOPEDICS | Facility: CLINIC | Age: 36
End: 2021-04-28
Payer: COMMERCIAL

## 2021-04-28 ENCOUNTER — HOSPITAL ENCOUNTER (OUTPATIENT)
Dept: LAB | Facility: CLINIC | Age: 36
Discharge: HOME | End: 2021-04-28
Attending: FAMILY MEDICINE | Admitting: FAMILY MEDICINE
Payer: COMMERCIAL

## 2021-04-28 VITALS
HEIGHT: 70 IN | SYSTOLIC BLOOD PRESSURE: 118 MMHG | DIASTOLIC BLOOD PRESSURE: 68 MMHG | WEIGHT: 177 LBS | BODY MASS INDEX: 25.34 KG/M2

## 2021-04-28 DIAGNOSIS — M25.561 RIGHT KNEE PAIN, UNSPECIFIED CHRONICITY: ICD-10-CM

## 2021-04-28 DIAGNOSIS — M25.461 SWELLING OF JOINT OF RIGHT KNEE: Primary | ICD-10-CM

## 2021-04-28 DIAGNOSIS — M25.461 SWELLING OF JOINT OF RIGHT KNEE: ICD-10-CM

## 2021-04-28 LAB
APPEARANCE FLD: NORMAL
COLOR FLD: YELLOW
CRYSTALS SNV MICRO: NORMAL
GRAM STN SPEC: NORMAL
GRAM STN SPEC: NORMAL
LYMPHOCYTES NFR FLD MANUAL: 51 %
MONOS+MACROS NFR FLD MANUAL: 35 %
NEUTS BAND NFR FLD MANUAL: 12 %
OTHER CELLS FLD MANUAL: 2 %
SPECIMEN SOURCE FLD: NORMAL
SPECIMEN SOURCE SNV: NORMAL
SPECIMEN SOURCE: NORMAL
WBC # FLD AUTO: 3814 /UL

## 2021-04-28 PROCEDURE — 89060 EXAM SYNOVIAL FLUID CRYSTALS: CPT | Performed by: FAMILY MEDICINE

## 2021-04-28 PROCEDURE — 87070 CULTURE OTHR SPECIMN AEROBIC: CPT | Performed by: FAMILY MEDICINE

## 2021-04-28 PROCEDURE — 89051 BODY FLUID CELL COUNT: CPT | Performed by: FAMILY MEDICINE

## 2021-04-28 PROCEDURE — 99214 OFFICE O/P EST MOD 30 MIN: CPT | Mod: 25 | Performed by: FAMILY MEDICINE

## 2021-04-28 PROCEDURE — 20611 DRAIN/INJ JOINT/BURSA W/US: CPT | Mod: RT | Performed by: FAMILY MEDICINE

## 2021-04-28 PROCEDURE — 87205 SMEAR GRAM STAIN: CPT | Performed by: FAMILY MEDICINE

## 2021-04-28 ASSESSMENT — MIFFLIN-ST. JEOR: SCORE: 1739.12

## 2021-04-28 NOTE — PATIENT INSTRUCTIONS
1. Swelling of joint of right knee    2. Right knee pain, unspecified chronicity      -Patient is following up for persistent right knee pain and swelling.  -Patient had a fluid drained out of his knee today.  Fluid will be sent off for analysis.  We will call him once we obtain the results.  -we will follow through with MRI of the right knee depending on the results of his synovial fluid analysis  -Your MRI has been ordered. You may call [phone] to schedule over the phone.  Please call my office 2 days after the MRI is complete to discuss results and next of treatment options.  -Call direct clinic number [[phone]] at any time with questions or concerns.    Albert Yeo MD [facility]  [facility] [place]

## 2021-04-28 NOTE — PROGRESS NOTES
"ASSESSMENT & PLAN  Patient Instructions     1. Swelling of joint of right knee    2. Right knee pain, unspecified chronicity      -Patient is following up for persistent right knee pain and swelling.  -Patient had a fluid drained out of his knee today.  Fluid will be sent off for analysis.  We will call him once we obtain the results.  -we will follow through with MRI of the right knee depending on the results of his synovial fluid analysis  -Your MRI has been ordered. You may call [phone] to schedule over the phone.  Please call my office 2 days after the MRI is complete to discuss results and next of treatment options.  -Call direct clinic number [[phone]] at any time with questions or concerns.    Albert Yeo MD [facility] Orthopedics and Sports Medicine  [facility]          -----    SUBJECTIVE:  Melecio Platt is a 35 year old male who is seen in follow-up for right knee pain and swelling likely due to cartilage and/or meniscus injuries.They were last seen 3/26/2021. Had his first covid vaccine yesterday.     Since their last visit reports 0% - (About the same as last time). They indicate that their current pain level is 1/10. They have tried home exercises and physical therapy (2 visits).      The patient is seen by themselves.    Patient's past medical, surgical, social, and family histories were reviewed today and no changes are noted.    REVIEW OF SYSTEMS:  Constitutional: NEGATIVE for fever, chills, change in weight  Skin: NEGATIVE for worrisome rashes, moles or lesions  GI/: NEGATIVE for bowel or bladder changes  Neuro: NEGATIVE for weakness, dizziness or paresthesias    OBJECTIVE:  /68   Ht 1.778 m (5' 10\")   Wt 80.3 kg (177 lb)   BMI 25.40 kg/m     General: healthy, alert and in no distress  HEENT: no scleral icterus or conjunctival erythema  Skin: no suspicious lesions or rash. No jaundice.  CV: regular rhythm by palpation, no pedal edema  Resp: normal " respiratory effort without conversational dyspnea   Psych: normal mood and affect  Gait: normal steady gait with appropriate coordination and balance  Neuro: normal light touch sensory exam of the extremities.    MSK:  Right KNEE  Inspection:    normal alignment  Palpation:    Tender about the lateral patellar facet, medial patellar facet, lateral joint line and medial joint line. Remainder of bony and ligamentous landmarks are nontender.    Mild effusion is present    Patellofemoral crepitus is Absent  Range of Motion:     00 extension to 1250 flexion  Strength:    Quadriceps grossly intact    Extensor mechanism intact  Special Tests:    Positive: Gerry's    Negative: MCL/valgus stress (0 & 30 deg), LCL/varus stress (0 & 30 deg), Lachman's, anterior drawer, posterior drawer    Independent visualization of the below image:    Large Joint Injection/Arthocentesis: R knee joint    Date/Time: 4/28/2021 3:37 PM  Performed by: Yeo, Albert, MD  Authorized by: Yeo, Albert, MD     Indications:  Pain and osteoarthritis  Needle Size:  25 G  Guidance: ultrasound    Approach:  Superolateral  Location:  Knee      Aspirate amount (mL):  12  Aspirate:  Yellow  Outcome:  Tolerated well, no immediate complications  Procedure discussed: discussed risks, benefits, and alternatives    Consent Given by:  Patient  Timeout: timeout called immediately prior to procedure    Prep: patient was prepped and draped in usual sterile fashion            Albert Yeo MD, [facility]opedic [facility] [place]

## 2021-04-28 NOTE — LETTER
4/28/2021         RE: Melecio Platt  [address] [facility] [address]        Dear Colleague,    Thank you for referring your patient, Melecio Platt, to the [facility] [place]. Please see a copy of my visit note below.    ASSESSMENT & PLAN  Patient Instructions     1. Swelling of joint of right knee    2. Right knee pain, unspecified chronicity      -Patient is following up for persistent right knee pain and swelling.  -Patient had a fluid drained out of his knee today.  Fluid will be sent off for analysis.  We will call him once we obtain the results.  -we will follow through with MRI of the right knee depending on the results of his synovial fluid analysis  -Your MRI has been ordered. You may call [phone] to schedule over the phone.  Please call my office 2 days after the MRI is complete to discuss results and next of treatment options.  -Call direct clinic number [[phone]] at any time with questions or concerns.    Albert Yeo MD [facility] Orthopedics and Sports Medicine  [facility]          -----    SUBJECTIVE:  Melecio Platt is a 35 year old male who is seen in follow-up for right knee pain and swelling likely due to cartilage and/or meniscus injuries.They were last seen 3/26/2021. Had his first covid vaccine yesterday.     Since their last visit reports 0% - (About the same as last time). They indicate that their current pain level is 1/10. They have tried home exercises and physical therapy (2 visits).      The patient is seen by themselves.    Patient's past medical, surgical, social, and family histories were reviewed today and no changes are noted.    REVIEW OF SYSTEMS:  Constitutional: NEGATIVE for fever, chills, change in weight  Skin: NEGATIVE for worrisome rashes, moles or lesions  GI/: NEGATIVE for bowel or bladder changes  Neuro: NEGATIVE for weakness, dizziness or paresthesias    OBJECTIVE:  /68   Ht  "1.778 m (5' 10\")   Wt 80.3 kg (177 lb)   BMI 25.40 kg/m     General: healthy, alert and in no distress  HEENT: no scleral icterus or conjunctival erythema  Skin: no suspicious lesions or rash. No jaundice.  CV: regular rhythm by palpation, no pedal edema  Resp: normal respiratory effort without conversational dyspnea   Psych: normal mood and affect  Gait: normal steady gait with appropriate coordination and balance  Neuro: normal light touch sensory exam of the extremities.    MSK:  Right KNEE  Inspection:    normal alignment  Palpation:    Tender about the lateral patellar facet, medial patellar facet, lateral joint line and medial joint line. Remainder of bony and ligamentous landmarks are nontender.    Mild effusion is present    Patellofemoral crepitus is Absent  Range of Motion:     00 extension to 1250 flexion  Strength:    Quadriceps grossly intact    Extensor mechanism intact  Special Tests:    Positive: Gerry's    Negative: MCL/valgus stress (0 & 30 deg), LCL/varus stress (0 & 30 deg), Lachman's, anterior drawer, posterior drawer    Independent visualization of the below image:    Large Joint Injection/Arthocentesis: R knee joint    Date/Time: 4/28/2021 3:37 PM  Performed by: Yeo, Albert, MD  Authorized by: Yeo, Albert, MD     Indications:  Pain and osteoarthritis  Needle Size:  25 G  Guidance: ultrasound    Approach:  Superolateral  Location:  Knee      Aspirate amount (mL):  12  Aspirate:  Yellow  Outcome:  Tolerated well, no immediate complications  Procedure discussed: discussed risks, benefits, and alternatives    Consent Given by:  Patient  Timeout: timeout called immediately prior to procedure    Prep: patient was prepped and draped in usual sterile fashion            Albert Yeo MD, [facility]            Again, thank you for allowing me to participate in the care of your patient.        Sincerely,        Albert Yeo, MD    "

## 2021-05-03 LAB
BACTERIA SPEC CULT: NO GROWTH
SPECIMEN SOURCE: NORMAL

## 2021-06-08 NOTE — PROGRESS NOTES
Patient did not return for further treatment and no additional progress was noted.  Please refer to the progress/soap note and goal flowsheet  for discharge information.

## 2021-10-03 ENCOUNTER — HEALTH MAINTENANCE LETTER (OUTPATIENT)
Age: 36
End: 2021-10-03

## 2021-12-23 ENCOUNTER — TELEPHONE (OUTPATIENT)
Dept: URGENT CARE | Facility: URGENT CARE | Age: 36
End: 2021-12-23

## 2021-12-23 ENCOUNTER — E-VISIT (OUTPATIENT)
Dept: URGENT CARE | Facility: CLINIC | Age: 36
End: 2021-12-23
Payer: COMMERCIAL

## 2021-12-23 ENCOUNTER — TELEPHONE (OUTPATIENT)
Dept: INTERNAL MEDICINE | Facility: CLINIC | Age: 36
End: 2021-12-23

## 2021-12-23 DIAGNOSIS — H01.00A BLEPHARITIS OF UPPER AND LOWER EYELIDS OF BOTH EYES, UNSPECIFIED TYPE: ICD-10-CM

## 2021-12-23 DIAGNOSIS — H01.00B BLEPHARITIS OF UPPER AND LOWER EYELIDS OF BOTH EYES, UNSPECIFIED TYPE: ICD-10-CM

## 2021-12-23 DIAGNOSIS — H01.00A BLEPHARITIS OF UPPER AND LOWER EYELIDS OF BOTH EYES, UNSPECIFIED TYPE: Primary | ICD-10-CM

## 2021-12-23 DIAGNOSIS — H01.00B BLEPHARITIS OF UPPER AND LOWER EYELIDS OF BOTH EYES, UNSPECIFIED TYPE: Primary | ICD-10-CM

## 2021-12-23 PROCEDURE — 99421 OL DIG E/M SVC 5-10 MIN: CPT | Performed by: PHYSICIAN ASSISTANT

## 2021-12-23 RX ORDER — ERYTHROMYCIN 5 MG/G
OINTMENT OPHTHALMIC
Qty: 3.5 G | Refills: 0 | Status: SHIPPED | OUTPATIENT
Start: 2021-12-23 | End: 2022-05-27

## 2021-12-23 RX ORDER — ERYTHROMYCIN 5 MG/G
0.5 OINTMENT OPHTHALMIC 2 TIMES DAILY
Qty: 3.5 G | Refills: 0 | Status: SHIPPED | OUTPATIENT
Start: 2021-12-23 | End: 2021-12-23

## 2021-12-23 NOTE — PATIENT INSTRUCTIONS
Thank you for choosing us for your care. I have placed an order for a prescription so that you can start treatment. View your full visit summary for details by clicking on the link below. Your pharmacist will able to address any questions you may have about the medication.     If you re not feeling better within 2-3 days, please schedule an appointment.  You can schedule an appointment right here in [facility], or call [phone]  If the visit is for the same symptoms as your eVisit, we ll refund the cost of your eVisit if seen within seven days.    Melecio, I have diagnosed you with blepharitis which an infection of the eyelids. Start the antibiotic ointment I have sent to your pharmacy    Yadiel Quintero PA-C

## 2021-12-23 NOTE — TELEPHONE ENCOUNTER
Pt calling back to check on the status of his med request, see note below.      Pt had an E-visit today, dx conjunctivitis, RX prescribed but has 2 different sigs, please clarify and send or call in the correct sig.      Pt hoping to get this tonight.  Pharmacy only open until 8PM.     Jennifer Rodriguez RN  [facility] Nurse Advisor

## 2021-12-23 NOTE — TELEPHONE ENCOUNTER
" Pharmacist calling to clarify Erythromycin directions. Pharmacy received 2 separate directions. Please send clarified script to [facility] [address] and [facility] in [place].         Erythromycin 5 MG/GM 0.5 inches Both Eyes 2 TIMES DAILY,   1/2\" ribbon to affected eye(s) four times daily for 5-7 days      "

## 2021-12-23 NOTE — TELEPHONE ENCOUNTER
"Reason for Call:  Other prescription    Detailed comments: patient calling stating he was prescribed erythromycin ointment but can't get it from the pharmacy because there are two different sigs. Please advise correct sig then let pharmacy know.     Sig - Route: Place 0.5 inches into both eyes 2 times daily for 7 days 1/2\" ribbon to affected eye(s) four times daily for 5-7 days - Both Eyes    Phone Number Patient can be reached at: Home number on file [phone] (home)    Best Time: Any    Can we leave a detailed message on this number? YES    Call taken on 12/23/2021 at 3:25 PM by Jeanine Wan      "

## 2021-12-24 NOTE — TELEPHONE ENCOUNTER
No response from E-visit pool so this RN will page the provider on-call for PCP [facility].     06:08PM: Smart Web used to page on-call Dr. Carol Cook to call this RN.   06:26PM: No response from provider, a second page was sent.       Provider called back and resent the RX with the correct sig.     Pharmacy confirmed they received the new RX and will work on filling this now.     Patient verbalized understanding and had no further questions.        Jennifer Rodriguez RN  [facility] Nurse Advisor

## 2022-01-06 ENCOUNTER — E-VISIT (OUTPATIENT)
Dept: INTERNAL MEDICINE | Facility: CLINIC | Age: 37
End: 2022-01-06
Payer: COMMERCIAL

## 2022-01-06 DIAGNOSIS — R59.9 SWOLLEN GLAND: ICD-10-CM

## 2022-01-06 DIAGNOSIS — R05.9 COUGH: Primary | ICD-10-CM

## 2022-01-06 PROCEDURE — 99421 OL DIG E/M SVC 5-10 MIN: CPT | Performed by: INTERNAL MEDICINE

## 2022-01-06 RX ORDER — ALBUTEROL SULFATE 90 UG/1
2 AEROSOL, METERED RESPIRATORY (INHALATION) EVERY 6 HOURS
Qty: 18 G | Refills: 0 | Status: SHIPPED | OUTPATIENT
Start: 2022-01-06 | End: 2022-07-21

## 2022-01-11 ENCOUNTER — LAB (OUTPATIENT)
Dept: URGENT CARE | Facility: URGENT CARE | Age: 37
End: 2022-01-11
Attending: INTERNAL MEDICINE
Payer: COMMERCIAL

## 2022-01-11 DIAGNOSIS — R05.9 COUGH: ICD-10-CM

## 2022-01-11 PROCEDURE — U0005 INFEC AGEN DETEC AMPLI PROBE: HCPCS

## 2022-01-11 PROCEDURE — U0003 INFECTIOUS AGENT DETECTION BY NUCLEIC ACID (DNA OR RNA); SEVERE ACUTE RESPIRATORY SYNDROME CORONAVIRUS 2 (SARS-COV-2) (CORONAVIRUS DISEASE [COVID-19]), AMPLIFIED PROBE TECHNIQUE, MAKING USE OF HIGH THROUGHPUT TECHNOLOGIES AS DESCRIBED BY CMS-2020-01-R: HCPCS

## 2022-01-12 LAB — SARS-COV-2 RNA RESP QL NAA+PROBE: NEGATIVE

## 2022-01-12 RX ORDER — AZITHROMYCIN 250 MG/1
TABLET, FILM COATED ORAL
Qty: 6 TABLET | Refills: 0 | Status: SHIPPED | OUTPATIENT
Start: 2022-01-12 | End: 2022-07-21

## 2022-03-20 ENCOUNTER — HEALTH MAINTENANCE LETTER (OUTPATIENT)
Age: 37
End: 2022-03-20

## 2022-05-10 DIAGNOSIS — E78.2 MIXED HYPERLIPIDEMIA: ICD-10-CM

## 2022-05-12 NOTE — TELEPHONE ENCOUNTER
Pending Prescriptions:                       Disp   Refills    rosuvastatin (CRESTOR) 20 MG tablet [Pharm*90 tab*1        Sig: TAKE 1 TABLET(20 MG) BY MOUTH DAILY    Routing refill request to provider for review/approval because:  Labs not current:  lipid panel    Please advise, thanks.

## 2022-05-13 NOTE — TELEPHONE ENCOUNTER
Patient was started on Crestor about 1 year ago and he was advised to follow-up in lipids 3 months after starting the medication but no labs done, no appointment made,.  No refills until labs and appointment.

## 2022-05-16 RX ORDER — ROSUVASTATIN CALCIUM 20 MG/1
TABLET, COATED ORAL
Qty: 90 TABLET | Refills: 1 | OUTPATIENT
Start: 2022-05-16

## 2022-05-23 ENCOUNTER — MYC MEDICAL ADVICE (OUTPATIENT)
Dept: INTERNAL MEDICINE | Facility: CLINIC | Age: 37
End: 2022-05-23
Payer: COMMERCIAL

## 2022-05-23 DIAGNOSIS — E78.2 MIXED HYPERLIPIDEMIA: ICD-10-CM

## 2022-05-23 NOTE — TELEPHONE ENCOUNTER
Patient scheduled soonest available OV on 07/06/22. Patient has been completely out of medication for some time.  Patient is asking for refills until seen.  Please advise.

## 2022-05-23 NOTE — TELEPHONE ENCOUNTER
Last refilled in 03/21 for 3 months with 1 refill, so pt has been out of med since 09/21. Please advise to get lipid labs and liver tests before I refill Crestor. Advise lab only appt and will evaluate at his appt on 07/06/22

## 2022-05-24 NOTE — TELEPHONE ENCOUNTER
Left message to call back , please help Patient schedule Lab appiontment before refills per Dr Parish Aragon,ANDREWN

## 2022-05-27 NOTE — TELEPHONE ENCOUNTER
Next 5 appointments (look out 90 days)    Jul 06, 2022  2:30 PM  (Arrive by 2:10 PM)  Adult Preventative Visit with Abbi Lugo MD  [facility] ([facility] ) [address] [facility] [id number] [phone]        Lab appointment 6/17/22.

## 2022-05-31 NOTE — TELEPHONE ENCOUNTER
Patient has lab appiontment on 6/17/22 8:15 am                       Provider appiontment 7/6/22 2:30 pm     Please advise SAIRA Aragon LPN

## 2022-06-17 ENCOUNTER — LAB (OUTPATIENT)
Dept: LAB | Facility: CLINIC | Age: 37
End: 2022-06-17
Payer: COMMERCIAL

## 2022-06-17 DIAGNOSIS — E78.2 MIXED HYPERLIPIDEMIA: ICD-10-CM

## 2022-06-17 LAB
ALT SERPL W P-5'-P-CCNC: 27 U/L (ref 0–70)
AST SERPL W P-5'-P-CCNC: 21 U/L (ref 0–45)
CHOLEST SERPL-MCNC: 309 MG/DL
FASTING STATUS PATIENT QL REPORTED: YES
HDLC SERPL-MCNC: 60 MG/DL
LDLC SERPL CALC-MCNC: 236 MG/DL
NONHDLC SERPL-MCNC: 249 MG/DL
TRIGL SERPL-MCNC: 63 MG/DL

## 2022-06-17 PROCEDURE — 84450 TRANSFERASE (AST) (SGOT): CPT

## 2022-06-17 PROCEDURE — 84460 ALANINE AMINO (ALT) (SGPT): CPT

## 2022-06-17 PROCEDURE — 80061 LIPID PANEL: CPT

## 2022-06-17 PROCEDURE — 36415 COLL VENOUS BLD VENIPUNCTURE: CPT

## 2022-07-21 ENCOUNTER — OFFICE VISIT (OUTPATIENT)
Dept: INTERNAL MEDICINE | Facility: CLINIC | Age: 37
End: 2022-07-21
Payer: COMMERCIAL

## 2022-07-21 VITALS
BODY MASS INDEX: 25.5 KG/M2 | TEMPERATURE: 97.9 F | SYSTOLIC BLOOD PRESSURE: 108 MMHG | WEIGHT: 178.1 LBS | RESPIRATION RATE: 17 BRPM | DIASTOLIC BLOOD PRESSURE: 64 MMHG | OXYGEN SATURATION: 98 % | HEIGHT: 70 IN | HEART RATE: 77 BPM

## 2022-07-21 DIAGNOSIS — E78.2 MIXED HYPERLIPIDEMIA: Primary | ICD-10-CM

## 2022-07-21 DIAGNOSIS — Z00.00 ROUTINE HISTORY AND PHYSICAL EXAMINATION OF ADULT: ICD-10-CM

## 2022-07-21 PROCEDURE — 99395 PREV VISIT EST AGE 18-39: CPT | Performed by: INTERNAL MEDICINE

## 2022-07-21 RX ORDER — ROSUVASTATIN CALCIUM 20 MG/1
20 TABLET, COATED ORAL DAILY
Qty: 90 TABLET | Refills: 3 | Status: SHIPPED | OUTPATIENT
Start: 2022-07-21

## 2022-07-21 NOTE — PROGRESS NOTES
SUBJECTIVE:   CC: Melecio Platt is an 36 year old male who presents for preventative health visit.       Patient has been advised of split billing requirements and indicates understanding: Yes  Healthy Habits:     Getting at least 3 servings of Calcium per day:  Yes    Bi-annual eye exam:  Yes    Dental care twice a year:  Yes    Sleep apnea or symptoms of sleep apnea:  None    Diet:  Regular (no restrictions)    Frequency of exercise:  4-5 days/week    Duration of exercise:  45-60 minutes    Taking medications regularly:  Yes    Barriers to taking medications:  Other    Medication side effects:  Muscle aches    PHQ-2 Total Score: 0    Additional concerns today:  No      Hyperlipidemia Follow-Up      Are you regularly taking any medication or supplement to lower your cholesterol?   No  Has been out of Crestor and not taking since 4 months. Pt says he is doing high intensity exercises regularly and had lipids on 06/22- Cholesterol 309, and       Are you having muscle aches or other side effects that you think could be caused by your cholesterol lowering medication?  NA      Today's PHQ-2 Score:   PHQ-2 ( 1999 [employer]) 7/21/2022   Q1: Little interest or pleasure in doing things 0   Q2: Feeling down, depressed or hopeless 0   PHQ-2 Score 0   PHQ-2 Total Score (12-17 Years)- Positive if 3 or more points; Administer PHQ-A if positive -   Q1: Little interest or pleasure in doing things -   Q2: Feeling down, depressed or hopeless -   PHQ-2 Score -       Abuse: Current or Past(Physical, Sexual or Emotional)- No  Do you feel safe in your environment? Yes      History reviewed. No pertinent past medical history.     Patient Active Problem List   Diagnosis     Mixed hyperlipidemia       Past Surgical History:   Procedure Laterality Date     ORTHOPEDIC SURGERY  2006    Bilateral ankle       Current Outpatient Medications   Medication Sig Dispense Refill     rosuvastatin (CRESTOR) 20 MG tablet Take 1 tablet (20 mg)  by mouth daily 90 tablet 3     Beta Glucan POWD 200 mg 1 tablet/daily (Patient not taking: Reported on 7/21/2022)       glucosamine-chondroitin 500-400 MG CAPS Take 1 capsule by mouth daily (Patient not taking: Reported on 7/21/2022)       Misc Natural Products (JOINT HEALTH PO)  (Patient not taking: Reported on 7/21/2022)       Omega-3 Fatty Acids (FISH OIL) 500 MG CAPS  (Patient not taking: Reported on 7/21/2022)         Family History   Problem Relation Age of Onset     Hyperlipidemia Mother      Hyperlipidemia Maternal Grandmother        Social History     Tobacco Use     Smoking status: Never Smoker     Smokeless tobacco: Never Used   Substance Use Topics     Alcohol use: Yes     Comment: 1x/week     If you drink alcohol do you typically have >3 drinks per day or >7 drinks per week? No    Alcohol Use 2/19/2021   Prescreen: >3 drinks/day or >7 drinks/week? No   Prescreen: >3 drinks/day or >7 drinks/week? -   No flowsheet data found.    Last PSA: No results found for: PSA    Reviewed orders with patient. Reviewed health maintenance and updated orders accordingly - Yes  Labs reviewed in EPIC    Reviewed and updated as needed this visit by clinical staff   Tobacco     Med Hx  Surg Hx  Fam Hx  Soc Hx          Reviewed and updated as needed this visit by Provider                       Review of Systems  CONSTITUTIONAL: NEGATIVE for fever, chills, change in weight  EYES: NEGATIVE for vision changes or irritation  ENT: NEGATIVE for ear, mouth and throat problems  RESP: NEGATIVE for significant cough or SOB  CV: NEGATIVE for chest pain, palpitations or peripheral edema  GI: NEGATIVE for nausea, abdominal pain, heartburn, or change in bowel habits   male: negative for dysuria, hematuria, decreased urinary stream, erectile dysfunction, urethral discharge  MUSCULOSKELETAL: NEGATIVE for significant arthralgias or myalgia  NEURO: NEGATIVE for weakness, dizziness or paresthesias  PSYCHIATRIC: NEGATIVE for changes in  "mood or affect    OBJECTIVE:   /64   Pulse 77   Temp 97.9  F (36.6  C) (Tympanic)   Resp 17   Ht 1.778 m (5' 10\")   Wt 80.8 kg (178 lb 1.6 oz)   SpO2 98%   BMI 25.55 kg/m      Physical Exam  GENERAL: healthy, alert and no distress  EYES: Eyes grossly normal to inspection, PERRL and conjunctivae and sclerae normal  NECK: no adenopathy, no asymmetry, masses, or scars and thyroid normal to palpation  RESP: lungs clear to auscultation - no rales, rhonchi or wheezes  CV: regular rate and rhythm, normal S1 S2, no S3 or S4, no murmur, click or rub, no peripheral edema and peripheral pulses strong  ABDOMEN: soft, nontender, no hepatosplenomegaly, no masses and bowel sounds normal  MS: no gross musculoskeletal defects noted, no edema  NEURO: Normal strength and tone, mentation intact and speech normal  PSYCH: mentation appears normal, affect normal/bright    Diagnostic Test Results:  Labs reviewed in Epic    ASSESSMENT/PLAN:     (Z00.00) Routine history and physical examination of adult  Plan: Comprehensive metabolic panel, Lipid panel         reflex to direct LDL Fasting, Hemoglobin            (E78.2) Mixed hyperlipidemia   Plan:lipids reviewed with pt, started back on  rosuvastatin (CRESTOR) 20 MG tablet daily as directed.explained clearly about the medication,insructions and side effects. Pt would like to take every other day initially, advised to follow low fat diet,exercise and rpt lipids in 3 months.          Patient has been advised of split billing requirements and indicates understanding: Yes    COUNSELING:   Reviewed preventive health counseling, as reflected in patient instructions       Regular exercise       Healthy diet/nutrition       Immunizations    Declined covid 19 booster vaccine        Estimated body mass index is 25.55 kg/m  as calculated from the following:    Height as of this encounter: 1.778 m (5' 10\").    Weight as of this encounter: 80.8 kg (178 lb 1.6 oz).     Weight management plan: " Discussed healthy diet and exercise guidelines    He reports that he has never smoked. He has never used smokeless tobacco.      Counseling Resources:  ATP IV Guidelines  Pooled Cohorts Equation Calculator  FRAX Risk Assessment  ICSI Preventive Guidelines  Dietary Guidelines for Americans, 2010  USDA's MyPlate  ASA Prophylaxis  Lung CA Screening    Abbi Lugo MD  [facility]

## 2022-07-21 NOTE — PROGRESS NOTES
{PROVIDER CHARTING PREFERENCE:072505}    Markos Majano is a 36 year old{ACCOMPANIED BY STATEMENT (Optional):780300}, presenting for the following health issues:  Lipids      HPI     Hyperlipidemia Follow-Up      Are you regularly taking any medication or supplement to lower your cholesterol?   { :746908}    Are you having muscle aches or other side effects that you think could be caused by your cholesterol lowering medication?  { :676477}      How many servings of fruits and vegetables do you eat daily?  { :916516}    On average, how many sweetened beverages do you drink each day (Examples: soda, juice, sweet tea, etc.  Do NOT count diet or artificially sweetened beverages)?   { 1-11:068498}    How many days per week do you exercise enough to make your heart beat faster? { :698594}    How many minutes a day do you exercise enough to make your heart beat faster? { :703558}    How many days per week do you miss taking your medication? {0-7 :315409}    {additonal problems for provider to add (Optional):710131}    Review of Systems   {ROS COMP (Optional):047115}      Objective    There were no vitals taken for this visit.  There is no height or weight on file to calculate BMI.  Physical Exam   {Exam List (Optional):516201}    {Diagnostic Test Results (Optional):313206}    {AMBULATORY ATTESTATION (Optional):873730}            .  ..

## 2022-09-10 ENCOUNTER — HEALTH MAINTENANCE LETTER (OUTPATIENT)
Age: 37
End: 2022-09-10

## 2023-06-26 ENCOUNTER — OFFICE VISIT (OUTPATIENT)
Dept: ORTHOPEDICS | Facility: CLINIC | Age: 38
End: 2023-06-26
Payer: COMMERCIAL

## 2023-06-26 VITALS — WEIGHT: 185 LBS | HEIGHT: 70 IN | BODY MASS INDEX: 26.48 KG/M2

## 2023-06-26 DIAGNOSIS — M25.561 PAIN AND SWELLING OF RIGHT KNEE: Primary | ICD-10-CM

## 2023-06-26 DIAGNOSIS — M25.461 PAIN AND SWELLING OF RIGHT KNEE: Primary | ICD-10-CM

## 2023-06-26 PROCEDURE — 20611 DRAIN/INJ JOINT/BURSA W/US: CPT | Mod: RT | Performed by: FAMILY MEDICINE

## 2023-06-26 RX ORDER — SIMVASTATIN 10 MG
TABLET ORAL
COMMUNITY
Start: 2023-06-12

## 2023-06-26 RX ORDER — LIDOCAINE HYDROCHLORIDE 10 MG/ML
5 INJECTION, SOLUTION INFILTRATION; PERINEURAL
Status: SHIPPED | OUTPATIENT
Start: 2023-06-26

## 2023-06-26 RX ADMIN — LIDOCAINE HYDROCHLORIDE 5 ML: 10 INJECTION, SOLUTION INFILTRATION; PERINEURAL at 10:09

## 2023-06-26 NOTE — PROGRESS NOTES
"ASSESSMENT & PLAN  Patient Instructions     1. Pain and swelling of right knee      -Patient is following up for recurrence of right knee pain and swelling  -Patient has been participating in regular high-impact workouts and activities which is slowly cause recurrence of right knee effusion  -Patient tolerated aspiration today without complications.  Patient was given postprocedure instructions  -Patient will get an MRI of the right knee for further evaluation of likely cartilage and meniscus degenerative injuries causing recurrence of knee effusion  - Your MRI has been ordered. You may call [phone] to schedule over the phone. Once you get notified on [employer] of your results, send me a [employer] message to discuss results and next of treatment options.    -Call direct clinic number [[phone]] at any time with questions or concerns.    Albert Yeo MD [facility] Orthopedics and Sports Medicine  [facility]          -----    SUBJECTIVE:  Melecio Platt is a 37 year old male who is seen in follow-up for right knee pain. They were last seen 4/28/21 and had right knee aspiration.     Since their last visit reports worsening posterior knee pain and swelling over the last 2 months. They indicate that their current pain level is 2/10. They have tried rest/activity avoidance, ibuprofen PRN, pillow between knees, and previous imaging (xray 3/5/21), knee sleeve.      The patient is seen by themselves.    Patient's past medical, surgical, social, and family histories were reviewed today and no changes are noted.    REVIEW OF SYSTEMS:  Constitutional: NEGATIVE for fever, chills, change in weight  Skin: NEGATIVE for worrisome rashes, moles or lesions  GI/: NEGATIVE for bowel or bladder changes  Neuro: NEGATIVE for weakness, dizziness or paresthesias    OBJECTIVE:  Ht 1.778 m (5' 10\")   Wt 83.9 kg (185 lb)   BMI 26.54 kg/m     General: healthy, alert and in no distress  HEENT: no scleral icterus " or conjunctival erythema  Skin: no suspicious lesions or rash. No jaundice.  CV: regular rhythm by palpation, no pedal edema  Resp: normal respiratory effort without conversational dyspnea   Psych: normal mood and affect  Gait: normal steady gait with appropriate coordination and balance  Neuro: normal light touch sensory exam of the extremities.    MSK:  RIGHT KNEE  Inspection:    edema mild  Palpation:    Tender about the medial joint line. Remainder of bony and ligamentous landmarks are nontender.    Mild effusion is present    Patellofemoral crepitus is Absent  Range of Motion:     00 extension to 1200 flexion  Strength:    Quadriceps grossly intact    Extensor mechanism intact  Special Tests:    Positive: none    Negative: MCL/valgus stress (0 & 30 deg), LCL/varus stress (0 & 30 deg), Lachman's, anterior drawer, posterior drawer, Gerry's    Independent visualization of the below image:  Results for orders placed or performed in visit on 03/05/21   XR Knee Standing AP Bilat [place] Bilat Lat Right    Narrative    No acute fracture, dislocation or osseous abnormalities.  Lateral tilting   of patella.     Large Joint Arthocentesis: R knee joint    Date/Time: 6/26/2023 10:09 AM    Performed by: Yeo, Albert, MD  Authorized by: Yeo, Albert, MD    Indications:  Pain  Needle Size:  22 G  Guidance: ultrasound    Approach:  Anterolateral  Location:  Knee      Medications:  5 mL lidocaine 1 %  Aspirate amount (mL):  25  Aspirate:  Serous and yellow  Outcome:  Tolerated well, no immediate complications  Procedure discussed: discussed risks, benefits, and alternatives    Consent Given by:  Patient  Timeout: timeout called immediately prior to procedure    Prep: patient was prepped and draped in usual sterile fashion              Albert Yeo MD, [facility] Sports and Orthopedic [facility]

## 2023-06-26 NOTE — LETTER
6/26/2023         RE: Melecio Platt  [address] [place] [address]        Dear Colleague,    Thank you for referring your patient, Melecio Platt, to the [facility] [place]. Please see a copy of my visit note below.    ASSESSMENT & PLAN  Patient Instructions     1. Pain and swelling of right knee      -Patient is following up for recurrence of right knee pain and swelling  -Patient has been participating in regular high-impact workouts and activities which is slowly cause recurrence of right knee effusion  -Patient tolerated aspiration today without complications.  Patient was given postprocedure instructions  -Patient will get an MRI of the right knee for further evaluation of likely cartilage and meniscus degenerative injuries causing recurrence of knee effusion  - Your MRI has been ordered. You may call [phone] to schedule over the phone. Once you get notified on [employer] of your results, send me a [employer] message to discuss results and next of treatment options.    -Call direct clinic number [[phone]] at any time with questions or concerns.    Albert Yeo MD [facility] Orthopedics and Sports Medicine  [facility]          -----    SUBJECTIVE:  Melecio Platt is a 37 year old male who is seen in follow-up for right knee pain. They were last seen 4/28/21 and had right knee aspiration.     Since their last visit reports worsening posterior knee pain and swelling over the last 2 months. They indicate that their current pain level is 2/10. They have tried rest/activity avoidance, ibuprofen PRN, pillow between knees, and previous imaging (xray 3/5/21), knee sleeve.      The patient is seen by themselves.    Patient's past medical, surgical, social, and family histories were reviewed today and no changes are noted.    REVIEW OF SYSTEMS:  Constitutional: NEGATIVE for fever, chills, change in weight  Skin: NEGATIVE for worrisome  "rashes, moles or lesions  GI/: NEGATIVE for bowel or bladder changes  Neuro: NEGATIVE for weakness, dizziness or paresthesias    OBJECTIVE:  Ht 1.778 m (5' 10\")   Wt 83.9 kg (185 lb)   BMI 26.54 kg/m     General: healthy, alert and in no distress  HEENT: no scleral icterus or conjunctival erythema  Skin: no suspicious lesions or rash. No jaundice.  CV: regular rhythm by palpation, no pedal edema  Resp: normal respiratory effort without conversational dyspnea   Psych: normal mood and affect  Gait: normal steady gait with appropriate coordination and balance  Neuro: normal light touch sensory exam of the extremities.    MSK:  RIGHT KNEE  Inspection:    edema mild  Palpation:    Tender about the medial joint line. Remainder of bony and ligamentous landmarks are nontender.    Mild effusion is present    Patellofemoral crepitus is Absent  Range of Motion:     00 extension to 1200 flexion  Strength:    Quadriceps grossly intact    Extensor mechanism intact  Special Tests:    Positive: none    Negative: MCL/valgus stress (0 & 30 deg), LCL/varus stress (0 & 30 deg), Lachman's, anterior drawer, posterior drawer, Gerry's    Independent visualization of the below image:  Results for orders placed or performed in visit on 03/05/21   XR Knee Standing AP Bilat [place] Bilat Lat Right    Narrative    No acute fracture, dislocation or osseous abnormalities.  Lateral tilting   of patella.     Large Joint Arthocentesis: R knee joint    Date/Time: 6/26/2023 10:09 AM    Performed by: Yeo, Albert, MD  Authorized by: Yeo, Albert, MD    Indications:  Pain  Needle Size:  22 G  Guidance: ultrasound    Approach:  Anterolateral  Location:  Knee      Medications:  5 mL lidocaine 1 %  Aspirate amount (mL):  25  Aspirate:  Serous and yellow  Outcome:  Tolerated well, no immediate complications  Procedure discussed: discussed risks, benefits, and alternatives    Consent Given by:  Patient  Timeout: timeout called immediately prior to " procedure    Prep: patient was prepped and draped in usual sterile fashion              Albert Yeo MD, [facility]            Again, thank you for allowing me to participate in the care of your patient.        Sincerely,        Albert Yeo, MD

## 2023-06-26 NOTE — PATIENT INSTRUCTIONS
1. Pain and swelling of right knee      -Patient is following up for recurrence of right knee pain and swelling  -Patient has been participating in regular high-impact workouts and activities which is slowly cause recurrence of right knee effusion  -Patient tolerated aspiration today without complications.  Patient was given postprocedure instructions  -Patient will get an MRI of the right knee for further evaluation of likely cartilage and meniscus degenerative injuries causing recurrence of knee effusion  - Your MRI has been ordered. You may call [phone] to schedule over the phone. Once you get notified on [employer] of your results, send me a [employer] message to discuss results and next of treatment options.    -Call direct clinic number [[phone]] at any time with questions or concerns.    Albert Yeo MD [facility]  [facility] [place]

## 2023-06-27 ENCOUNTER — MYC MEDICAL ADVICE (OUTPATIENT)
Dept: ORTHOPEDICS | Facility: CLINIC | Age: 38
End: 2023-06-27
Payer: COMMERCIAL

## 2023-06-27 DIAGNOSIS — M25.561 PAIN AND SWELLING OF RIGHT KNEE: Primary | ICD-10-CM

## 2023-06-27 DIAGNOSIS — M25.461 PAIN AND SWELLING OF RIGHT KNEE: Primary | ICD-10-CM

## 2023-06-28 RX ORDER — CELECOXIB 100 MG/1
100 CAPSULE ORAL 2 TIMES DAILY PRN
Qty: 60 CAPSULE | Refills: 0 | Status: SHIPPED | OUTPATIENT
Start: 2023-06-28

## 2023-06-28 NOTE — TELEPHONE ENCOUNTER
Patient last seen 6/26/23 for right knee pain. Patient had aspiration and MRI order was placed. Please see patient MyChart message and advise if Celebrex can be prescribed.     Sima Wolff MSA, ATC  Certified Athletic Trainer

## 2023-07-12 ENCOUNTER — MYC MEDICAL ADVICE (OUTPATIENT)
Dept: ORTHOPEDICS | Facility: CLINIC | Age: 38
End: 2023-07-12
Payer: COMMERCIAL

## 2023-07-12 DIAGNOSIS — M25.561 PAIN AND SWELLING OF RIGHT KNEE: Primary | ICD-10-CM

## 2023-07-12 DIAGNOSIS — M25.461 PAIN AND SWELLING OF RIGHT KNEE: Primary | ICD-10-CM

## 2023-07-12 NOTE — TELEPHONE ENCOUNTER
Patient was last seen on 6/26/23. Plan per LOV:  -Patient tolerated aspiration today without complications.  Patient was given postprocedure instructions  -Patient will get an MRI of the right knee for further evaluation of likely cartilage and meniscus degenerative injuries causing recurrence of knee effusion  - Your MRI has been ordered. You may call [phone] to schedule over the phone. Once you get notified on [employer] of your results, send me a [employer] message to discuss results and next of treatment options.      MRI order pending. Please advise.    Shana Scott MBA, ATC

## 2023-07-17 ENCOUNTER — HOSPITAL ENCOUNTER (OUTPATIENT)
Dept: MRI IMAGING | Facility: CLINIC | Age: 38
Discharge: HOME | End: 2023-07-17
Attending: FAMILY MEDICINE | Admitting: FAMILY MEDICINE
Payer: COMMERCIAL

## 2023-07-17 DIAGNOSIS — M25.561 PAIN AND SWELLING OF RIGHT KNEE: ICD-10-CM

## 2023-07-17 DIAGNOSIS — M25.461 PAIN AND SWELLING OF RIGHT KNEE: ICD-10-CM

## 2023-07-17 PROCEDURE — 73721 MRI JNT OF LWR EXTRE W/O DYE: CPT | Mod: RT

## 2023-07-28 ENCOUNTER — OFFICE VISIT (OUTPATIENT)
Dept: ORTHOPEDICS | Facility: CLINIC | Age: 38
End: 2023-07-28
Payer: COMMERCIAL

## 2023-07-28 VITALS
SYSTOLIC BLOOD PRESSURE: 118 MMHG | HEIGHT: 70 IN | WEIGHT: 182 LBS | BODY MASS INDEX: 26.05 KG/M2 | DIASTOLIC BLOOD PRESSURE: 74 MMHG

## 2023-07-28 DIAGNOSIS — M65.961 SYNOVITIS OF RIGHT KNEE: Primary | ICD-10-CM

## 2023-07-28 DIAGNOSIS — M76.51 PATELLAR TENDINITIS OF RIGHT KNEE: ICD-10-CM

## 2023-07-28 PROCEDURE — 20611 DRAIN/INJ JOINT/BURSA W/US: CPT | Mod: RT | Performed by: FAMILY MEDICINE

## 2023-07-28 RX ORDER — LIDOCAINE HYDROCHLORIDE 10 MG/ML
5 INJECTION, SOLUTION INFILTRATION; PERINEURAL
Status: SHIPPED | OUTPATIENT
Start: 2023-07-28

## 2023-07-28 RX ORDER — METHYLPREDNISOLONE ACETATE 40 MG/ML
40 INJECTION, SUSPENSION INTRA-ARTICULAR; INTRALESIONAL; INTRAMUSCULAR; SOFT TISSUE
Status: SHIPPED | OUTPATIENT
Start: 2023-07-28

## 2023-07-28 RX ORDER — ROPIVACAINE HYDROCHLORIDE 5 MG/ML
4 INJECTION, SOLUTION EPIDURAL; INFILTRATION; PERINEURAL
Status: SHIPPED | OUTPATIENT
Start: 2023-07-28

## 2023-07-28 RX ADMIN — LIDOCAINE HYDROCHLORIDE 5 ML: 10 INJECTION, SOLUTION INFILTRATION; PERINEURAL at 11:38

## 2023-07-28 RX ADMIN — ROPIVACAINE HYDROCHLORIDE 4 ML: 5 INJECTION, SOLUTION EPIDURAL; INFILTRATION; PERINEURAL at 11:38

## 2023-07-28 RX ADMIN — METHYLPREDNISOLONE ACETATE 40 MG: 40 INJECTION, SUSPENSION INTRA-ARTICULAR; INTRALESIONAL; INTRAMUSCULAR; SOFT TISSUE at 11:38

## 2023-07-28 ASSESSMENT — PAIN SCALES - GENERAL: PAINLEVEL: MILD PAIN (2)

## 2023-07-28 NOTE — PATIENT INSTRUCTIONS
1. Synovitis of right knee      -Patient is following up for chronic right knee pain and swelling and due to synovitis  -We reviewed and discussed MRI results which showed mild quadricep and patellar tendinitis with synovitis and an effusion.  All questions were answered  -Patient tolerated right knee intraarticular aspiration and cortisone injection today without complications.  Patient was given postprocedure instructions  -Patient will start formal physical therapy and home exercise program.  -Patient will slowly progress back to his normal workouts as his pain allows.  If patient does not get longstanding relief from today's injection, to consider a rheumatology referral to evaluate for potential inflammatory causes of the synovitis  -Call direct clinic number [[phone]] at any time with questions or concerns.    Albert Yeo MD [facility]  [facility] [place]

## 2023-07-28 NOTE — PROGRESS NOTES
ASSESSMENT & PLAN  Patient Instructions     1. Synovitis of right knee      -Patient is following up for chronic right knee pain and swelling and due to synovitis  -We reviewed and discussed MRI results which showed mild quadricep and patellar tendinitis with synovitis and an effusion.  All questions were answered  -Patient tolerated right knee intraarticular aspiration and cortisone injection today without complications.  Patient was given postprocedure instructions  -Patient will start formal physical therapy and home exercise program.  -Patient will slowly progress back to his normal workouts as his pain allows.  If patient does not get longstanding relief from today's injection, to consider a rheumatology referral to evaluate for potential inflammatory causes of the synovitis  -Call direct clinic number [[phone]] at any time with questions or concerns.    Albert Yeo MD [facility] Orthopedics and Sports Medicine  [facility]      -----    SUBJECTIVE:  Melecio Platt is a 37 year old male who is seen for STEROID INJECTION of right knee.         Musculoskeletal Problem          Large Joint Injection/Arthocentesis: R knee joint    Date/Time: 7/28/2023 11:38 AM    Performed by: Yeo, Albert, MD  Authorized by: Yeo, Albert, MD    Indications:  Pain and joint swelling  Needle Size:  22 G  Guidance: ultrasound    Approach:  Anterolateral  Location:  Knee      Medications:  40 mg methylPREDNISolone 40 MG/ML; 5 mL lidocaine 1 %; 4 mL ropivacaine 5 MG/ML  Aspirate amount (mL):  30  Aspirate:  Yellow and cloudy  Outcome:  Tolerated well, no immediate complications  Procedure discussed: discussed risks, benefits, and alternatives    Consent Given by:  Patient  Timeout: timeout called immediately prior to procedure    Prep: patient was prepped and draped in usual sterile fashion     Ultrasound was used to ensure safe and accurate needle placement and injection. Ultrasound images of the procedure  were permanently stored.            Albert Yeo MD, [facility]

## 2023-07-28 NOTE — LETTER
7/28/2023         RE: Melecio Platt  [address] [facility] [address]        Dear Colleague,    Thank you for referring your patient, Melecio Platt, to the [facility] [place]. Please see a copy of my visit note below.    ASSESSMENT & PLAN  Patient Instructions     1. Synovitis of right knee      -Patient is following up for chronic right knee pain and swelling and due to synovitis  -We reviewed and discussed MRI results which showed mild quadricep and patellar tendinitis with synovitis and an effusion.  All questions were answered  -Patient tolerated right knee intraarticular aspiration and cortisone injection today without complications.  Patient was given postprocedure instructions  -Patient will start formal physical therapy and home exercise program.  -Patient will slowly progress back to his normal workouts as his pain allows.  If patient does not get longstanding relief from today's injection, to consider a rheumatology referral to evaluate for potential inflammatory causes of the synovitis  -Call direct clinic number [[phone]] at any time with questions or concerns.    Albert Yeo MD [facility] Orthopedics and Sports Medicine  [facility] [place]      -----    SUBJECTIVE:  Melecio Platt is a 37 year old male who is seen for STEROID INJECTION of right knee.         Musculoskeletal Problem          Large Joint Injection/Arthocentesis: R knee joint    Date/Time: 7/28/2023 11:38 AM    Performed by: Yeo, Albert, MD  Authorized by: Yeo, Albert, MD    Indications:  Pain and joint swelling  Needle Size:  22 G  Guidance: ultrasound    Approach:  Anterolateral  Location:  Knee      Medications:  40 mg methylPREDNISolone 40 MG/ML; 5 mL lidocaine 1 %; 4 mL ropivacaine 5 MG/ML  Aspirate amount (mL):  30  Aspirate:  Yellow and cloudy  Outcome:  Tolerated well, no immediate complications  Procedure discussed: discussed risks, benefits, and  alternatives    Consent Given by:  Patient  Timeout: timeout called immediately prior to procedure    Prep: patient was prepped and draped in usual sterile fashion     Ultrasound was used to ensure safe and accurate needle placement and injection. Ultrasound images of the procedure were permanently stored.            Albert Yeo MD, [facility]      Again, thank you for allowing me to participate in the care of your patient.        Sincerely,        Albert Yeo, MD

## 2023-10-01 ENCOUNTER — HEALTH MAINTENANCE LETTER (OUTPATIENT)
Age: 38
End: 2023-10-01

## 2023-10-29 ENCOUNTER — OFFICE VISIT (OUTPATIENT)
Dept: URGENT CARE | Facility: URGENT CARE | Age: 38
End: 2023-10-29
Payer: COMMERCIAL

## 2023-10-29 VITALS — TEMPERATURE: 97.1 F | OXYGEN SATURATION: 99 % | HEART RATE: 67 BPM

## 2023-10-29 DIAGNOSIS — S91.111A LACERATION OF RIGHT GREAT TOE WITHOUT FOREIGN BODY PRESENT OR DAMAGE TO NAIL, INITIAL ENCOUNTER: Primary | ICD-10-CM

## 2023-10-29 PROCEDURE — 90471 IMMUNIZATION ADMIN: CPT | Performed by: PHYSICIAN ASSISTANT

## 2023-10-29 PROCEDURE — 12001 RPR S/N/AX/GEN/TRNK 2.5CM/<: CPT | Mod: T5 | Performed by: PHYSICIAN ASSISTANT

## 2023-10-29 PROCEDURE — 90715 TDAP VACCINE 7 YRS/> IM: CPT | Performed by: PHYSICIAN ASSISTANT

## 2023-10-29 NOTE — PROGRESS NOTES
Assessment & Plan     Laceration of right great toe without foreign body present or damage to nail, initial encounter  Repaired as below with 3 interrupted sutures.  Keep wound clean and dry for the next 24-48 hours  Ok to shower and get wound wet after 48 hours, but do not soak for 2 weeks  Wound follow-up: Return for suture removal in 10 days  May return to work/school as long as wound is kept clean and dry  Discussed the probability of scarring    Patient will return immediately if they experience redness around the laceration, drainage, worsening pain, or fever.      Tetanus updated today    - REPAIR SUPERFICIAL, WOUND BODY < =2.5CM      Return in about 10 days (around 11/8/2023) for suture removal.    Ida Spencer PA-C  [facility] [place] is a 37 year old male who presents to clinic today for the following health issues:  Chief Complaint   Patient presents with    Urgent Care    Laceration     Pt was cleaning his pocket knife and slipped and cut his right big toe, Tdap- 11/18/2014     HPI      Laceration    Mechanism of injury: Inadvertently sustained a laceration on the dorsum of the right great toe with a knife this morning  History provided by: Patient  Time of injury was 1 hours(s) ago.    This is a non-work related injury.    Associated symptoms: Denies numbness, weakness, or loss of function    Last tetanus booster within 10 years: No.  Tetanus updated today.    LACERATION EXAM:   Size of laceration: 1.5 centimeters  Characteristics of the laceration: linear  Depth of laceration: deep  Tendon function intact: Yes  Sensation to light touch intact: Yes  Pulses/capillary refill intact: Yes  Foreign body: No    Picture included in patient's chart: no    PROCEDURE NOTE:  Anesthesia: Wound was locally injected with 1 cc's of  Lidocaine 1% plain  Prepped and draped in the usual sterile fashion  Wound irrigated with sterile water  Wound was explored for any foreign  bodies and evaluated for tendon, nerve, vessel or joint involvement.    Closure was simple  Laceration was closed with 3 X 4.0 Nylon interrupted sutures  Good skin approximation obtained, good hemostasis obtained  Bandage was applied  Patient tolerated the procedure well      Review of Systems  Constitutional, HEENT, cardiovascular, pulmonary, GI, , musculoskeletal, neuro, skin, endocrine and psych systems are negative, except as otherwise noted.      Objective    Pulse 67   Temp 97.1  F (36.2  C) (Tympanic)   SpO2 99%   Physical Exam   GENERAL: healthy, alert and no distress  MS: Right great toe exam: There is about a 1.5 cm noted on the dorsum of the great toe, bleeding is controlled, range of motion of the right great toe is normal.

## 2023-10-29 NOTE — PATIENT INSTRUCTIONS
Laceration right great toe:    Keep wound clean and dry for the next 24-48 hours  Ok to shower and get wound wet after 48 hours, but do not soak for 2 weeks  Wound follow-up: Return for suture removal in 10 days  May return to work/school as long as wound is kept clean and dry  There is a probability of scarring  Active range of motion exercises encouraged for finger lacerations    Please return immediately if you experience redness around the laceration, drainage, worsening pain, or fever.

## 2023-12-04 ENCOUNTER — VIRTUAL VISIT (OUTPATIENT)
Dept: UROLOGY | Facility: CLINIC | Age: 38
End: 2023-12-04
Payer: COMMERCIAL

## 2023-12-04 DIAGNOSIS — Z30.2 ENCOUNTER FOR STERILIZATION: Primary | ICD-10-CM

## 2023-12-04 PROCEDURE — 99203 OFFICE O/P NEW LOW 30 MIN: CPT | Mod: VID | Performed by: UROLOGY

## 2023-12-04 ASSESSMENT — PAIN SCALES - GENERAL: PAINLEVEL: NO PAIN (0)

## 2023-12-04 NOTE — LETTER
12/4/2023       RE: Melecio Platt  [address] [facility] [address]     Dear Colleague,    Thank you for referring your patient, Melecio Platt, to the [facility] [place] at [facility]. Please see a copy of my visit note below.    VASECTOMY CONSULTATION NOTE  [facility]  [phone]  DATE OF VISIT: 12/4/2023    PATIENT NAME: Melecio Platt    YOB: 1985      REASON FOR CONSULTATION: Mr. Melecio Platt is a 38 year old year old gentleman who is being seen as a virtual visit in the urology clinic today requesting a vasectomy. He has 2 children and he wishes to have a vasectomy for birth control. He has no prior urologic history and has had no prior surgery on the testicles. He has no symptoms in the testicles.    He reports to me that his wife has PCOS and they required fertility assistance for both of their children.  He is not certain if a vasectomy is necessary in their case but he wanted to hear more about the procedure today.    PAST MEDICAL HISTORY: No past medical history on file.    PAST SURGICAL HISTORY:   Past Surgical History:   Procedure Laterality Date    ORTHOPEDIC SURGERY  2006    Bilateral ankle       MEDICATIONS:   Current Outpatient Medications:     celecoxib (CELEBREX) 100 MG capsule, Take 1 capsule (100 mg) by mouth 2 times daily as needed for moderate pain, Disp: 60 capsule, Rfl: 0    simvastatin (ZOCOR) 10 MG tablet, , Disp: , Rfl:     Beta Glucan POWD, 200 mg 1 tablet/daily (Patient not taking: Reported on 7/21/2022), Disp: , Rfl:     glucosamine-chondroitin 500-400 MG CAPS, Take 1 capsule by mouth daily (Patient not taking: Reported on 7/21/2022), Disp: , Rfl:     Misc Natural Products (JOINT HEALTH PO), , Disp: , Rfl:     Omega-3 Fatty Acids (FISH OIL) 500 MG CAPS, , Disp: , Rfl:     rosuvastatin (CRESTOR) 20 MG tablet, Take 1 tablet (20 mg) by mouth daily (Patient not  taking: Reported on 6/26/2023), Disp: 90 tablet, Rfl: 3    Current Facility-Administered Medications:     4 mL ropivacaine (NAROPIN) injection 5 mg/mL, 4 mL, , , Yeo, Albert, MD, 4 mL at 07/28/23 1138    lidocaine 1 % injection 5 mL, 5 mL, , , Yeo, Albert, MD, 5 mL at 07/28/23 1138    lidocaine 1 % injection 5 mL, 5 mL, , , Yeo, Albert, MD, 5 mL at 06/26/23 1009    methylPREDNISolone (DEPO-MEDROL) injection 40 mg, 40 mg, , , Yeo, Albert, MD, 40 mg at 07/28/23 1138    ALLERGIES:   Allergies   Allergen Reactions    Seasonal Allergies Cough, Difficulty breathing, Fatigue, Headache and Shortness Of Breath       FAMILY HISTORY:   Family History   Problem Relation Age of Onset    Hyperlipidemia Mother     Hyperlipidemia Maternal Grandmother        SOCIAL HISTORY:   Social History     Socioeconomic History    Marital status:      Spouse name: Not on file    Number of children: Not on file    Years of education: Not on file    Highest education level: Not on file   Occupational History    Not on file   Tobacco Use    Smoking status: Never     Passive exposure: Never    Smokeless tobacco: Never   Substance and Sexual Activity    Alcohol use: Yes     Comment: 1x/week    Drug use: No    Sexual activity: Yes     Partners: Female   Other Topics Concern    Not on file   Social History Narrative    Not on file     Social Determinants of Health     Financial Resource Strain: Not on file   Food Insecurity: Not on file   Transportation Needs: Not on file   Physical Activity: Not on file   Stress: Not on file   Social Connections: Not on file   Interpersonal Safety: Not on file   Housing Stability: Not on file       REVIEW OF SYSTEMS:  Skin: No rash, pruritis, or skin pigmentation  Eyes: No changes in vision  Ears/Nose/Throat: No changes in hearing, no nosebleeds  Respiratory: No shortness of breath, dyspnea on exertion, cough, or hemoptysis  Cardiovascular: No chest pain or palpitations  Gastrointestinal: No diarrhea or  constipation. No abdominal pain. No hematochezia  Genitourinary: see HPI  Musculoskeletal: No pain or swelling of joints, normal range of motion  Neurologic: No weakness or tremors  Psychiatric: No recent changes in memory or mood  Hematologic/Lymphatic/Immunologic: No easy bruising or enlarged lymph nodes  Endocrine: No weight gain or loss      HEIGHT: Data Unavailable     WEIGHT: 0 lbs 0 oz   BP: Data Unavailable    PULSE: Data Unavailable    EXAM:   General: Alert and oriented to time, place, and self. In NAD   HEENT: Head AT/NC, EOMI, CN Grossly intact   Lungs: no respiratory distress, or pursed lip breathing   Heart: No obvious jugular venous distension present   Musculoskeltal: Normal movements. Normal appearing musculature  Skin: no suspicious lesions or rashes   Neuro: Alert, oriented, speech and mentation normal; moving all 4 extremities equally.   Psych: affect and mood normal      DIAGNOSIS: Request for sterilization    PLAN: The risks of the procedure as well as expectations for recovery and outcomes were splint in detail to him.  He was counseled on the risks for bleeding infection and pain after the procedure.  He was instructed to continue to use contraception until he had proven azoospermia on a semen specimen.  This would normally be collected at least 3 months after the procedure.  He was instructed to hold all anticoagulants medications for one week prior to the procedure.  He was also instructed to shave the scrotum prior to procedure.  It was recommended that he have someone else drive him home after his vasectomy.  In light of these risks and expectations he would like to proceed.  We are scheduling a vasectomy in the office in the near future.  This visit today was performed via video.  He understands that because of this I was not able to examine the testicles in person today.  I will perform an examination at the beginning of the vasectomy and he understands that there is a small chance the  procedure may need to be moved to the operating room.    We discussed his overall situation.  I recommended that he see their fertility specialist again and ask if it is possible for his wife to become pregnant on her own at this time.  If they do feel that is a possibility, then vasectomy would be a good option for him.  If the fertility specialist does not think that his wife can become pregnant again, then a vasectomy would likely be unnecessary.  He will call the office if he would like to schedule.    Rohit Velásquez M.D.      Virtual Visit Details    Type of service:  Video Visit     Originating Location (pt. Location): Home    Distant Location (provider location):  On-site  Platform used for Video Visit: Radha

## 2023-12-04 NOTE — NURSING NOTE
Is the patient currently in the state of [place]? YES    Visit mode:VIDEO    If the visit is dropped, the patient can be reconnected by: VIDEO VISIT: Text to cell phone:   Telephone Information:   Mobile [phone]       Will anyone else be joining the visit? NO  (If patient encounters technical issues they should call [phone]    How would you like to obtain your AVS? MyChart    Are changes needed to the allergy or medication list? No    Reason for visit: No chief complaint on file.    Hanane WARREN

## 2023-12-04 NOTE — PROGRESS NOTES
VASECTOMY CONSULTATION NOTE  [facility]  [phone]  DATE OF VISIT: 12/4/2023    PATIENT NAME: Melecio Platt    YOB: 1985      REASON FOR CONSULTATION: Mr. Melecio Platt is a 38 year old year old gentleman who is being seen as a virtual visit in the urology clinic today requesting a vasectomy. He has 2 children and he wishes to have a vasectomy for birth control. He has no prior urologic history and has had no prior surgery on the testicles. He has no symptoms in the testicles.    He reports to me that his wife has PCOS and they required fertility assistance for both of their children.  He is not certain if a vasectomy is necessary in their case but he wanted to hear more about the procedure today.    PAST MEDICAL HISTORY: No past medical history on file.    PAST SURGICAL HISTORY:   Past Surgical History:   Procedure Laterality Date    ORTHOPEDIC SURGERY  2006    Bilateral ankle       MEDICATIONS:   Current Outpatient Medications:     celecoxib (CELEBREX) 100 MG capsule, Take 1 capsule (100 mg) by mouth 2 times daily as needed for moderate pain, Disp: 60 capsule, Rfl: 0    simvastatin (ZOCOR) 10 MG tablet, , Disp: , Rfl:     Beta Glucan POWD, 200 mg 1 tablet/daily (Patient not taking: Reported on 7/21/2022), Disp: , Rfl:     glucosamine-chondroitin 500-400 MG CAPS, Take 1 capsule by mouth daily (Patient not taking: Reported on 7/21/2022), Disp: , Rfl:     Misc Natural Products (JOINT HEALTH PO), , Disp: , Rfl:     Omega-3 Fatty Acids (FISH OIL) 500 MG CAPS, , Disp: , Rfl:     rosuvastatin (CRESTOR) 20 MG tablet, Take 1 tablet (20 mg) by mouth daily (Patient not taking: Reported on 6/26/2023), Disp: 90 tablet, Rfl: 3    Current Facility-Administered Medications:     4 mL ropivacaine (NAROPIN) injection 5 mg/mL, 4 mL, , , Yeo, Albert, MD, 4 mL at 07/28/23 1138    lidocaine 1 % injection 5 mL, 5 mL, , , Yeo, Albert, MD, 5 mL at 07/28/23 1138    lidocaine 1 % injection 5 mL, 5  mL, , , Yeo, Albert, MD, 5 mL at 06/26/23 1009    methylPREDNISolone (DEPO-MEDROL) injection 40 mg, 40 mg, , , Yeo, Albert, MD, 40 mg at 07/28/23 1138    ALLERGIES:   Allergies   Allergen Reactions    Seasonal Allergies Cough, Difficulty breathing, Fatigue, Headache and Shortness Of Breath       FAMILY HISTORY:   Family History   Problem Relation Age of Onset    Hyperlipidemia Mother     Hyperlipidemia Maternal Grandmother        SOCIAL HISTORY:   Social History     Socioeconomic History    Marital status:      Spouse name: Not on file    Number of children: Not on file    Years of education: Not on file    Highest education level: Not on file   Occupational History    Not on file   Tobacco Use    Smoking status: Never     Passive exposure: Never    Smokeless tobacco: Never   Substance and Sexual Activity    Alcohol use: Yes     Comment: 1x/week    Drug use: No    Sexual activity: Yes     Partners: Female   Other Topics Concern    Not on file   Social History Narrative    Not on file     Social Determinants of Health     Financial Resource Strain: Not on file   Food Insecurity: Not on file   Transportation Needs: Not on file   Physical Activity: Not on file   Stress: Not on file   Social Connections: Not on file   Interpersonal Safety: Not on file   Housing Stability: Not on file       REVIEW OF SYSTEMS:  Skin: No rash, pruritis, or skin pigmentation  Eyes: No changes in vision  Ears/Nose/Throat: No changes in hearing, no nosebleeds  Respiratory: No shortness of breath, dyspnea on exertion, cough, or hemoptysis  Cardiovascular: No chest pain or palpitations  Gastrointestinal: No diarrhea or constipation. No abdominal pain. No hematochezia  Genitourinary: see HPI  Musculoskeletal: No pain or swelling of joints, normal range of motion  Neurologic: No weakness or tremors  Psychiatric: No recent changes in memory or mood  Hematologic/Lymphatic/Immunologic: No easy bruising or enlarged lymph nodes  Endocrine: No  weight gain or loss      HEIGHT: Data Unavailable     WEIGHT: 0 lbs 0 oz   BP: Data Unavailable    PULSE: Data Unavailable    EXAM:   General: Alert and oriented to time, place, and self. In NAD   HEENT: Head AT/NC, EOMI, CN Grossly intact   Lungs: no respiratory distress, or pursed lip breathing   Heart: No obvious jugular venous distension present   Musculoskeltal: Normal movements. Normal appearing musculature  Skin: no suspicious lesions or rashes   Neuro: Alert, oriented, speech and mentation normal; moving all 4 extremities equally.   Psych: affect and mood normal      DIAGNOSIS: Request for sterilization    PLAN: The risks of the procedure as well as expectations for recovery and outcomes were splint in detail to him.  He was counseled on the risks for bleeding infection and pain after the procedure.  He was instructed to continue to use contraception until he had proven azoospermia on a semen specimen.  This would normally be collected at least 3 months after the procedure.  He was instructed to hold all anticoagulants medications for one week prior to the procedure.  He was also instructed to shave the scrotum prior to procedure.  It was recommended that he have someone else drive him home after his vasectomy.  In light of these risks and expectations he would like to proceed.  We are scheduling a vasectomy in the office in the near future.  This visit today was performed via video.  He understands that because of this I was not able to examine the testicles in person today.  I will perform an examination at the beginning of the vasectomy and he understands that there is a small chance the procedure may need to be moved to the operating room.    We discussed his overall situation.  I recommended that he see their fertility specialist again and ask if it is possible for his wife to become pregnant on her own at this time.  If they do feel that is a possibility, then vasectomy would be a good option for him.   If the fertility specialist does not think that his wife can become pregnant again, then a vasectomy would likely be unnecessary.  He will call the office if he would like to schedule.    Rohit Velásquez M.D.      Virtual Visit Details    Type of service:  Video Visit     Originating Location (pt. Location): Home    Distant Location (provider location):  On-site  Platform used for Video Visit: Radha

## 2024-11-24 ENCOUNTER — HEALTH MAINTENANCE LETTER (OUTPATIENT)
Age: 39
End: 2024-11-24